# Patient Record
Sex: FEMALE | Race: BLACK OR AFRICAN AMERICAN | Employment: OTHER | URBAN - METROPOLITAN AREA
[De-identification: names, ages, dates, MRNs, and addresses within clinical notes are randomized per-mention and may not be internally consistent; named-entity substitution may affect disease eponyms.]

---

## 2020-12-20 ENCOUNTER — APPOINTMENT (OUTPATIENT)
Dept: CT IMAGING | Age: 75
End: 2020-12-20
Attending: EMERGENCY MEDICINE
Payer: MEDICARE

## 2020-12-20 ENCOUNTER — HOSPITAL ENCOUNTER (OUTPATIENT)
Age: 75
Setting detail: OBSERVATION
Discharge: HOME | End: 2020-12-21
Attending: EMERGENCY MEDICINE | Admitting: STUDENT IN AN ORGANIZED HEALTH CARE EDUCATION/TRAINING PROGRAM
Payer: MEDICARE

## 2020-12-20 ENCOUNTER — APPOINTMENT (OUTPATIENT)
Dept: GENERAL RADIOLOGY | Age: 75
End: 2020-12-20
Attending: STUDENT IN AN ORGANIZED HEALTH CARE EDUCATION/TRAINING PROGRAM
Payer: MEDICARE

## 2020-12-20 DIAGNOSIS — I63.9 CEREBROVASCULAR ACCIDENT (CVA), UNSPECIFIED MECHANISM (HCC): Primary | ICD-10-CM

## 2020-12-20 PROBLEM — R09.89 SUSPECTED CEREBROVASCULAR ACCIDENT (CVA): Status: ACTIVE | Noted: 2020-12-20

## 2020-12-20 LAB
ANION GAP SERPL CALC-SCNC: 11 MMOL/L (ref 5–15)
APPEARANCE UR: CLEAR
BACTERIA URNS QL MICRO: NEGATIVE /HPF
BASOPHILS # BLD: 0.4 K/UL (ref 0–0.1)
BASOPHILS NFR BLD: 4 % (ref 0–1)
BILIRUB UR QL: NEGATIVE
BUN SERPL-MCNC: 24 MG/DL (ref 6–20)
BUN/CREAT SERPL: 20 (ref 12–20)
CALCIUM SERPL-MCNC: 9.1 MG/DL (ref 8.5–10.1)
CHLORIDE SERPL-SCNC: 100 MMOL/L (ref 97–108)
CO2 SERPL-SCNC: 30 MMOL/L (ref 21–32)
COLOR UR: NORMAL
COVID-19 RAPID TEST, COVR: NOT DETECTED
CREAT SERPL-MCNC: 1.22 MG/DL (ref 0.55–1.02)
DIFFERENTIAL METHOD BLD: ABNORMAL
EOSINOPHIL # BLD: 1.1 K/UL (ref 0–0.4)
EOSINOPHIL NFR BLD: 10 % (ref 0–7)
EPITH CASTS URNS QL MICRO: NORMAL /LPF
ERYTHROCYTE [DISTWIDTH] IN BLOOD BY AUTOMATED COUNT: 13.5 % (ref 11.5–14.5)
GLUCOSE BLD STRIP.AUTO-MCNC: 102 MG/DL (ref 65–100)
GLUCOSE BLD STRIP.AUTO-MCNC: 147 MG/DL (ref 65–100)
GLUCOSE SERPL-MCNC: 92 MG/DL (ref 65–100)
GLUCOSE UR STRIP.AUTO-MCNC: NEGATIVE MG/DL
HCT VFR BLD AUTO: 40.8 % (ref 35–47)
HEALTH STATUS, XMCV2T: NORMAL
HGB BLD-MCNC: 13.8 G/DL (ref 11.5–16)
HGB UR QL STRIP: NEGATIVE
IMM GRANULOCYTES # BLD AUTO: 0 K/UL
IMM GRANULOCYTES NFR BLD AUTO: 0 %
KETONES UR QL STRIP.AUTO: NEGATIVE MG/DL
LEUKOCYTE ESTERASE UR QL STRIP.AUTO: NEGATIVE
LYMPHOCYTES # BLD: 3.2 K/UL (ref 0.8–3.5)
LYMPHOCYTES NFR BLD: 30 % (ref 12–49)
MCH RBC QN AUTO: 29.4 PG (ref 26–34)
MCHC RBC AUTO-ENTMCNC: 33.8 G/DL (ref 30–36.5)
MCV RBC AUTO: 86.8 FL (ref 80–99)
MONOCYTES # BLD: 0.9 K/UL (ref 0–1)
MONOCYTES NFR BLD: 9 % (ref 5–13)
NEUTS SEG # BLD: 4.9 K/UL (ref 1.8–8)
NEUTS SEG NFR BLD: 47 % (ref 32–75)
NITRITE UR QL STRIP.AUTO: NEGATIVE
NRBC # BLD: 0 K/UL (ref 0–0.01)
NRBC BLD-RTO: 0 PER 100 WBC
PH UR STRIP: 7 [PH] (ref 5–8)
PLATELET # BLD AUTO: 238 K/UL (ref 150–400)
PMV BLD AUTO: 11.3 FL (ref 8.9–12.9)
POTASSIUM SERPL-SCNC: 3.2 MMOL/L (ref 3.5–5.1)
PROT UR STRIP-MCNC: NEGATIVE MG/DL
RBC # BLD AUTO: 4.7 M/UL (ref 3.8–5.2)
RBC #/AREA URNS HPF: NORMAL /HPF (ref 0–5)
RBC MORPH BLD: ABNORMAL
SERVICE CMNT-IMP: ABNORMAL
SERVICE CMNT-IMP: ABNORMAL
SODIUM SERPL-SCNC: 141 MMOL/L (ref 136–145)
SOURCE, COVRS: NORMAL
SP GR UR REFRACTOMETRY: 1.01 (ref 1–1.03)
SPECIMEN SOURCE, FCOV2M: NORMAL
SPECIMEN TYPE, XMCV1T: NORMAL
TROPONIN I SERPL-MCNC: <0.05 NG/ML
UA: UC IF INDICATED,UAUC: NORMAL
UROBILINOGEN UR QL STRIP.AUTO: 0.2 EU/DL (ref 0.2–1)
WBC # BLD AUTO: 10.5 K/UL (ref 3.6–11)
WBC MORPH BLD: ABNORMAL
WBC URNS QL MICRO: NORMAL /HPF (ref 0–4)

## 2020-12-20 PROCEDURE — 87635 SARS-COV-2 COVID-19 AMP PRB: CPT

## 2020-12-20 PROCEDURE — 74011250637 HC RX REV CODE- 250/637: Performed by: EMERGENCY MEDICINE

## 2020-12-20 PROCEDURE — 96374 THER/PROPH/DIAG INJ IV PUSH: CPT

## 2020-12-20 PROCEDURE — 74011250637 HC RX REV CODE- 250/637: Performed by: STUDENT IN AN ORGANIZED HEALTH CARE EDUCATION/TRAINING PROGRAM

## 2020-12-20 PROCEDURE — 36415 COLL VENOUS BLD VENIPUNCTURE: CPT

## 2020-12-20 PROCEDURE — 85025 COMPLETE CBC W/AUTO DIFF WBC: CPT

## 2020-12-20 PROCEDURE — 80048 BASIC METABOLIC PNL TOTAL CA: CPT

## 2020-12-20 PROCEDURE — 74011250636 HC RX REV CODE- 250/636: Performed by: STUDENT IN AN ORGANIZED HEALTH CARE EDUCATION/TRAINING PROGRAM

## 2020-12-20 PROCEDURE — 71045 X-RAY EXAM CHEST 1 VIEW: CPT

## 2020-12-20 PROCEDURE — 96372 THER/PROPH/DIAG INJ SC/IM: CPT

## 2020-12-20 PROCEDURE — 82962 GLUCOSE BLOOD TEST: CPT

## 2020-12-20 PROCEDURE — 84484 ASSAY OF TROPONIN QUANT: CPT

## 2020-12-20 PROCEDURE — 74011000250 HC RX REV CODE- 250: Performed by: EMERGENCY MEDICINE

## 2020-12-20 PROCEDURE — 74011636637 HC RX REV CODE- 636/637: Performed by: EMERGENCY MEDICINE

## 2020-12-20 PROCEDURE — 70450 CT HEAD/BRAIN W/O DYE: CPT

## 2020-12-20 PROCEDURE — 94640 AIRWAY INHALATION TREATMENT: CPT

## 2020-12-20 PROCEDURE — 74011000636 HC RX REV CODE- 636: Performed by: STUDENT IN AN ORGANIZED HEALTH CARE EDUCATION/TRAINING PROGRAM

## 2020-12-20 PROCEDURE — 99284 EMERGENCY DEPT VISIT MOD MDM: CPT

## 2020-12-20 PROCEDURE — 99218 HC RM OBSERVATION: CPT

## 2020-12-20 PROCEDURE — 81001 URINALYSIS AUTO W/SCOPE: CPT

## 2020-12-20 PROCEDURE — 70496 CT ANGIOGRAPHY HEAD: CPT

## 2020-12-20 PROCEDURE — 93005 ELECTROCARDIOGRAM TRACING: CPT

## 2020-12-20 RX ORDER — MONTELUKAST SODIUM 10 MG/1
10 TABLET ORAL
Status: DISCONTINUED | OUTPATIENT
Start: 2020-12-20 | End: 2020-12-21 | Stop reason: HOSPADM

## 2020-12-20 RX ORDER — LABETALOL HCL 20 MG/4 ML
20 SYRINGE (ML) INTRAVENOUS
Status: DISCONTINUED | OUTPATIENT
Start: 2020-12-20 | End: 2020-12-21 | Stop reason: HOSPADM

## 2020-12-20 RX ORDER — AMLODIPINE BESYLATE 5 MG/1
TABLET ORAL
COMMUNITY
Start: 2020-09-21

## 2020-12-20 RX ORDER — MAGNESIUM SULFATE 100 %
4 CRYSTALS MISCELLANEOUS AS NEEDED
Status: DISCONTINUED | OUTPATIENT
Start: 2020-12-20 | End: 2020-12-21 | Stop reason: HOSPADM

## 2020-12-20 RX ORDER — IPRATROPIUM BROMIDE AND ALBUTEROL SULFATE 2.5; .5 MG/3ML; MG/3ML
3 SOLUTION RESPIRATORY (INHALATION)
Status: COMPLETED | OUTPATIENT
Start: 2020-12-20 | End: 2020-12-20

## 2020-12-20 RX ORDER — ACETAMINOPHEN 325 MG/1
650 TABLET ORAL
Status: DISCONTINUED | OUTPATIENT
Start: 2020-12-20 | End: 2020-12-21 | Stop reason: HOSPADM

## 2020-12-20 RX ORDER — ASPIRIN 81 MG/1
81 TABLET ORAL DAILY
COMMUNITY

## 2020-12-20 RX ORDER — POTASSIUM CHLORIDE 750 MG/1
40 TABLET, FILM COATED, EXTENDED RELEASE ORAL
Status: COMPLETED | OUTPATIENT
Start: 2020-12-20 | End: 2020-12-20

## 2020-12-20 RX ORDER — ONDANSETRON 2 MG/ML
4 INJECTION INTRAMUSCULAR; INTRAVENOUS
Status: DISCONTINUED | OUTPATIENT
Start: 2020-12-20 | End: 2020-12-21 | Stop reason: HOSPADM

## 2020-12-20 RX ORDER — PREDNISONE 20 MG/1
60 TABLET ORAL
Status: DISCONTINUED | OUTPATIENT
Start: 2020-12-21 | End: 2020-12-20

## 2020-12-20 RX ORDER — GUAIFENESIN 100 MG/5ML
81 LIQUID (ML) ORAL DAILY
Status: DISCONTINUED | OUTPATIENT
Start: 2020-12-21 | End: 2020-12-21 | Stop reason: HOSPADM

## 2020-12-20 RX ORDER — ACETAMINOPHEN 650 MG/1
650 SUPPOSITORY RECTAL
Status: DISCONTINUED | OUTPATIENT
Start: 2020-12-20 | End: 2020-12-21 | Stop reason: HOSPADM

## 2020-12-20 RX ORDER — ENOXAPARIN SODIUM 100 MG/ML
40 INJECTION SUBCUTANEOUS EVERY 24 HOURS
Status: DISCONTINUED | OUTPATIENT
Start: 2020-12-20 | End: 2020-12-21 | Stop reason: HOSPADM

## 2020-12-20 RX ORDER — ATORVASTATIN CALCIUM 40 MG/1
40 TABLET, FILM COATED ORAL
Status: DISCONTINUED | OUTPATIENT
Start: 2020-12-20 | End: 2020-12-21 | Stop reason: HOSPADM

## 2020-12-20 RX ORDER — DEXTROSE 50 % IN WATER (D50W) INTRAVENOUS SYRINGE
25-50 AS NEEDED
Status: DISCONTINUED | OUTPATIENT
Start: 2020-12-20 | End: 2020-12-21 | Stop reason: HOSPADM

## 2020-12-20 RX ORDER — HYDROCHLOROTHIAZIDE 25 MG/1
TABLET ORAL
COMMUNITY
Start: 2020-09-21

## 2020-12-20 RX ORDER — INSULIN LISPRO 100 [IU]/ML
INJECTION, SOLUTION INTRAVENOUS; SUBCUTANEOUS
Status: DISCONTINUED | OUTPATIENT
Start: 2020-12-20 | End: 2020-12-21 | Stop reason: HOSPADM

## 2020-12-20 RX ORDER — CLOPIDOGREL BISULFATE 75 MG/1
75 TABLET ORAL DAILY
Status: DISCONTINUED | OUTPATIENT
Start: 2020-12-20 | End: 2020-12-21 | Stop reason: HOSPADM

## 2020-12-20 RX ORDER — PREDNISONE 20 MG/1
60 TABLET ORAL
Status: COMPLETED | OUTPATIENT
Start: 2020-12-20 | End: 2020-12-20

## 2020-12-20 RX ORDER — MONTELUKAST SODIUM 10 MG/1
TABLET ORAL
COMMUNITY
Start: 2020-09-21

## 2020-12-20 RX ADMIN — IOPAMIDOL 80 ML: 755 INJECTION, SOLUTION INTRAVENOUS at 16:42

## 2020-12-20 RX ADMIN — MONTELUKAST SODIUM 10 MG: 10 TABLET, FILM COATED ORAL at 22:00

## 2020-12-20 RX ADMIN — METHYLPREDNISOLONE SODIUM SUCCINATE 40 MG: 40 INJECTION, POWDER, FOR SOLUTION INTRAMUSCULAR; INTRAVENOUS at 22:35

## 2020-12-20 RX ADMIN — IPRATROPIUM BROMIDE AND ALBUTEROL 1 PUFF: 20; 100 SPRAY, METERED RESPIRATORY (INHALATION) at 22:35

## 2020-12-20 RX ADMIN — ENOXAPARIN SODIUM 40 MG: 100 INJECTION SUBCUTANEOUS at 22:32

## 2020-12-20 RX ADMIN — PREDNISONE 60 MG: 20 TABLET ORAL at 17:07

## 2020-12-20 RX ADMIN — POTASSIUM CHLORIDE 40 MEQ: 750 TABLET, EXTENDED RELEASE ORAL at 16:58

## 2020-12-20 RX ADMIN — IPRATROPIUM BROMIDE AND ALBUTEROL SULFATE 3 ML: .5; 3 SOLUTION RESPIRATORY (INHALATION) at 17:08

## 2020-12-20 RX ADMIN — CLOPIDOGREL BISULFATE 75 MG: 75 TABLET, FILM COATED ORAL at 22:34

## 2020-12-20 RX ADMIN — ATORVASTATIN CALCIUM 40 MG: 40 TABLET, FILM COATED ORAL at 22:34

## 2020-12-20 NOTE — H&P
Hospitalist Admission Note    NAME: Terri Becerra   :  1945   MRN:  [id number]   Room Number: TW96/58  @ [facility]     Date/Time:  2020 5:31 PM    Patient PCP: Elder Cason MD  ______________________________________________________________________  Given the patient's current clinical presentation, I have a high level of concern for decompensation if discharged from the emergency department. Complex decision making was performed, which includes reviewing the patient's available past medical records, laboratory results, and x-ray films. My assessment of this patient's clinical condition and my plan of care is as follows. Assessment / Plan: Active Problems:    Suspected cerebrovascular accident (CVA) (2020)      Suspected CVA/TIA POA  Acute onset dizziness, blurry vision that resolved on presentation to the ER. CT head interval independently negative for acute intracranial hemorrhage. CTA head and neck with a technically limited studyno evidence for central large vessel arterial occlusion    Aspirin, Plavix, statin  MRI brain without contrast  TTE  Continue telemetry  Allow permissive hypertension. SBP goal less than 220 mm Hg, DBP Goal < 120 mm Hg   Neurology consult  PT/OT  No speech or swallowing dysfunction, does not need SLP eval      Acute exacerbation of moderate persistent asthma POA  Report wheezing for past 5 days. Saturating well on room air, not in respiratory distress. CXR interpreted independentlyshows no acute process. Rapid Covid test negative. Patient does not take influenza vaccine. Solu-Medrol 40 mg IV every 8 hours  Montelukast  Albuterol nebulizer scheduled and as needed. Hypertension POA  Hyperlipidemia POA  - check lipid panel  - statin   - Hold antihypertensives. Allow permissive hypertension - goal SBP < 220 mm Hg, goal DBP < 120 mm Hg    Elevated creatinine POA   BUN/Cr /1. 22. Unclear baseline. - Repeat chem 10 tomorrow. Could have ckd due to long standing hypertension       Morbid obesity POA BMI 32.54. Counseled on Lifestyle modifications, AHA Diet ,weight loss strategies. There is no height or weight on file to calculate BMI. Code Status: full  Surrogate Decision Maker:  Alem Garcia, daughter, cellphone number [phone]      DVT Prophylaxis: Lovenox  GI Prophylaxis: not indicated  Baseline: ambulatory independently        Subjective:   CHIEF COMPLAINT: lightheadeness, blurry vision    HISTORY OF PRESENT ILLNESS:     Terri eBcerra is a 76 y.o.  female with PMH of HTN, HLD who presents to ED with c/o blurred vision, dizziness. Patient reports acute onset of blurred vision, dizziness described as \"my head felt weird. I felt like I was about to pass out and felt off balance \"that began around 1:30 PM this afternoon when she was cooking. Symptoms have completely resolved on presentation to the ER. She denies any focal weakness or numbness, dysarthria, dysphagia, hearing loss, tinnitus, facial asymmetry. She lives at home with her grandson. Works part-time as a nurse at [employer]. Reports wheezing, nonproductive cough for the past 5 days which she feels is usual for her asthma exacerbation. Reports that usually occurs with cold weather. Denies any fevers or chills. No known exposure to COVID-19. Has not taken an influenza vaccination this season, she never takes influenza vaccinations. Non-smoker  No alcohol use  No illicit drug use  Ambulatory independently    We were asked to admit for work up and evaluation of the above problems. Past Medical History:   Diagnosis Date    Hypertension         History reviewed. No pertinent surgical history.     Social History     Tobacco Use    Smoking status: Never Smoker    Smokeless tobacco: Never Used   Substance Use Topics    Alcohol Use     Frequency: Never        Hx of CAD in father, T2DM in mother  Allergies   Allergen Reactions    Lisinopril Cough        Prior to Admission medications    Medication Sig Start Date End Date Taking? Authorizing Provider   montelukast (SINGULAIR) 10 mg tablet  9/21/20  Yes Other, MD Bianca   hydroCHLOROthiazide (HYDRODIURIL) 25 mg tablet  9/21/20  Yes Other, MD Bianca   amLODIPine (NORVASC) 5 mg tablet  9/21/20  Yes Other, MD Bianca   aspirin delayed-release 81 mg tablet Take 81 mg by mouth daily. Yes Other, MD Bianca       REVIEW OF SYSTEMS:     I am not able to complete the review of systems because:    The patient is intubated and sedated    The patient has altered mental status due to his acute medical problems    The patient has baseline aphasia from prior stroke(s)    The patient has baseline dementia and is not reliable historian    The patient is in acute medical distress and unable to provide information           Total of 12 systems reviewed as follows:       POSITIVE= underlined text  Negative = text not underlined  General:  fever, chills, sweats, generalized weakness, weight loss/gain,      loss of appetite   Eyes:    blurred vision, eye pain, loss of vision, double vision  ENT:    rhinorrhea, pharyngitis   Respiratory:   cough, sputum production, SOB, FLORES, wheezing, pleuritic pain   Cardiology:   chest pain, palpitations, orthopnea, PND, edema, syncope   Gastrointestinal:  abdominal pain , N/V, diarrhea, dysphagia, constipation, bleeding   Genitourinary:  frequency, urgency, dysuria, hematuria, incontinence   Muskuloskeletal :  arthralgia, myalgia, back pain  Hematology:  easy bruising, nose or gum bleeding, lymphadenopathy   Dermatological: rash, ulceration, pruritis, color change / jaundice  Endocrine:   hot flashes or polydipsia   Neurological:  headache, dizziness, confusion, focal weakness, paresthesia,     Speech difficulties, memory loss, gait difficulty  Psychological: Feelings of anxiety, depression, agitation    Objective:   VITALS:    Visit Vitals  BP (!) 178/86   Pulse 78   Temp 98.4 °F (36.9 °C)   Resp 19   Wt 116.3 kg (256 lb 8 oz)   SpO2 100%       PHYSICAL EXAM:    General:    Alert, cooperative, no distress, appears stated age. HEENT: Atraumatic, anicteric sclerae, pink conjunctivae     No oral ulcers, mucosa moist, throat clear, dentition fair  Neck:  Supple, symmetrical,  thyroid: non tender  Lungs:   Bilateral expiratory wheezing  Chest wall:  No tenderness  No Accessory muscle use. Heart:   Regular  rhythm,  No  murmur   No edema  Abdomen:   Soft, non-tender. Not distended. Bowel sounds normal  Extremities: No cyanosis. No clubbing,      Skin turgor normal, Capillary refill normal, Radial dial pulse 2+  Skin:     Not pale. Not Jaundiced  No rashes   Psych:  Good insight. Not depressed. Not anxious or agitated. Neurologic: EOMs intact. No facial asymmetry. No aphasia or slurred speech. Symmetrical strength, Sensation grossly intact. Alert and oriented X 4.     ______________________________________________________________________    Care Plan discussed with:  Patient/Family and Nurse    Expected  Disposition:  Home w/Family  ________________________________________________________________________  TOTAL TIME:  36 Minutes    Critical Care Provided     Minutes non procedure based      Comments    x Reviewed previous records   >50% of visit spent in counseling and coordination of care x Discussion with patient and/or family and questions answered       ________________________________________________________________________  Signed: Evan Jacobs MD    Procedures: see electronic medical records for all procedures/Xrays and details which were not copied into this note but were reviewed prior to creation of Plan.     LAB DATA REVIEWED:    Recent Results (from the past 24 hour(s))   GLUCOSE, POC    Collection Time: 12/20/20  2:44 PM   Result Value Ref Range    Glucose (POC) 102 (H) 65 - 100 mg/dL    Performed by Esperanza Pino \"Elizabeth\" EKG, 12 LEAD, INITIAL    Collection Time: 12/20/20  3:15 PM   Result Value Ref Range    Ventricular Rate 73 BPM    Atrial Rate 73 BPM    P-R Interval 214 ms    QRS Duration 94 ms    Q-T Interval 420 ms    QTC Calculation (Bezet) 462 ms    Calculated P Axis 43 degrees    Calculated R Axis 47 degrees    Calculated T Axis 53 degrees    Diagnosis       Sinus rhythm with 1st degree AV block  Otherwise normal ECG  No previous ECGs available     CBC WITH AUTOMATED DIFF    Collection Time: 12/20/20  3:30 PM   Result Value Ref Range    WBC 10.5 3.6 - 11.0 K/uL    RBC 4.70 3.80 - 5.20 M/uL    HGB 13.8 11.5 - 16.0 g/dL    HCT 40.8 35.0 - 47.0 %    MCV 86.8 80.0 - 99.0 FL    MCH 29.4 26.0 - 34.0 PG    MCHC 33.8 30.0 - 36.5 g/dL    RDW 13.5 11.5 - 14.5 %    PLATELET 743 656 - 615 K/uL    MPV 11.3 8.9 - 12.9 FL    NRBC 0.0 0  WBC    ABSOLUTE NRBC 0.00 0.00 - 0.01 K/uL    NEUTROPHILS 47 32 - 75 %    LYMPHOCYTES 30 12 - 49 %    MONOCYTES 9 5 - 13 %    EOSINOPHILS 10 (H) 0 - 7 %    BASOPHILS 4 (H) 0 - 1 %    IMMATURE GRANULOCYTES 0 %    ABS. NEUTROPHILS 4.9 1.8 - 8.0 K/UL    ABS. LYMPHOCYTES 3.2 0.8 - 3.5 K/UL    ABS. MONOCYTES 0.9 0.0 - 1.0 K/UL    ABS. EOSINOPHILS 1.1 (H) 0.0 - 0.4 K/UL    ABS. BASOPHILS 0.4 (H) 0.0 - 0.1 K/UL    ABS. IMM.  GRANS. 0.0 K/UL    DF MANUAL      RBC COMMENTS NORMOCYTIC, NORMOCHROMIC      WBC COMMENTS REACTIVE LYMPHS     METABOLIC PANEL, BASIC    Collection Time: 12/20/20  3:30 PM   Result Value Ref Range    Sodium 141 136 - 145 mmol/L    Potassium 3.2 (L) 3.5 - 5.1 mmol/L    Chloride 100 97 - 108 mmol/L    CO2 30 21 - 32 mmol/L    Anion gap 11 5 - 15 mmol/L    Glucose 92 65 - 100 mg/dL    BUN 24 (H) 6 - 20 MG/DL    Creatinine 1.22 (H) 0.55 - 1.02 MG/DL    BUN/Creatinine ratio 20 12 - 20      GFR est AA 52 (L) >60 ml/min/1.73m2    GFR est non-AA 43 (L) >60 ml/min/1.73m2    Calcium 9.1 8.5 - 10.1 MG/DL   TROPONIN I    Collection Time: 12/20/20  3:30 PM   Result Value Ref Range    Troponin-I, Qt. <0.05 <0.05 ng/mL   URINALYSIS W/ REFLEX CULTURE    Collection Time: 12/20/20  3:30 PM    Specimen: Urine   Result Value Ref Range    Color YELLOW/STRAW      Appearance CLEAR CLEAR      Specific gravity 1.015 1.003 - 1.030      pH (UA) 7.0 5.0 - 8.0      Protein Negative NEG mg/dL    Glucose Negative NEG mg/dL    Ketone Negative NEG mg/dL    Bilirubin Negative NEG      Blood Negative NEG      Urobilinogen 0.2 0.2 - 1.0 EU/dL    Nitrites Negative NEG      Leukocyte Esterase Negative NEG      WBC 0-4 0 - 4 /hpf    RBC 0-5 0 - 5 /hpf    Epithelial cells FEW FEW /lpf    Bacteria Negative NEG /hpf    UA:UC IF INDICATED CULTURE NOT INDICATED BY UA RESULT CNI

## 2020-12-20 NOTE — ED NOTES
Pt presents to ED ambulatory complaining of unsteady gait, dizziness, feeling like \"I was gonna pass out\", feeling \"weird\" since 1330. NIH score 0. Pt denies blurred vision, n/v, fever, chills, cp, sob. Pt reports productive cough (clear, thick) x weeks due to \"allergies\". Pt reports taking musinex. Pt is alert and oriented x 4, RR even and unlabored, skin is warm and dry. Assessment completed and pt updated on plan of care. Call bell in reach. 1659 update: pt reports wheezing, pt reports using neb at home 2-3x a day    Emergency [address] is developed from the Nursing assessment and Emergency Department Attending provider initial evaluation. The plan of care may be reviewed in the ED Provider note.     The Plan of Care was developed with the following considerations:   Patient / Family readiness to learn indicated by:verbalized understanding  Persons(s) to be included in education: patient  Barriers to Learning/Limitations:No    Signed     Mildred Kras    12/20/2020   3:28 PM

## 2020-12-20 NOTE — ED PROVIDER NOTES
EMERGENCY DEPARTMENT HISTORY AND PHYSICAL EXAM      Date: 12/20/2020  Patient Name: Kirsten Pastrana    History of Presenting Illness     Chief Complaint   Patient presents with    Dizziness    Gait Problem       History Provided By: Patient    HPI: Kirsten Pastrana, 76 y.o. female with h/o HTN presents to the ED for an abrupt onset of \"feeling funny in the head\" a/w blurry vision that began while she was cooking just shortly PTA. Denies any headache, nausea/vomiting, or numbness/weakness of her extremities. No aggravating, or relieving factors. She has never experienced these Sx before. Denies Hx of stroke. There are no other complaints, changes, or physical findings at this time. PCP: No primary care provider on file. No current facility-administered medications on file prior to encounter. Current Outpatient Medications on File Prior to Encounter   Medication Sig Dispense Refill    montelukast (SINGULAIR) 10 mg tablet       hydroCHLOROthiazide (HYDRODIURIL) 25 mg tablet       amLODIPine (NORVASC) 5 mg tablet       aspirin delayed-release 81 mg tablet Take 81 mg by mouth daily. Past History     Past Medical History:  History reviewed. No pertinent past medical history. Past Surgical History:  History reviewed. No pertinent surgical history. Family History:  History reviewed. No pertinent family history. Social History:  Social History     Tobacco Use    Smoking status: Never Smoker    Smokeless tobacco: Never Used   Substance Use Topics    Alcohol Use     Frequency: Never    Drug use: Not on file       Allergies: Allergies   Allergen Reactions    Lisinopril Cough         Review of Systems   Review of Systems   Constitutional: Negative for chills, fatigue and fever. HENT: Negative. Negative for sore throat. Eyes: Positive for visual disturbance. Respiratory: Negative for cough and shortness of breath. Cardiovascular: Negative for chest pain and palpitations. Gastrointestinal: Negative for abdominal pain, nausea and vomiting. Genitourinary: Negative for dysuria. Musculoskeletal: Negative. Skin: Negative. Neurological: Negative for dizziness, facial asymmetry, speech difficulty, weakness, light-headedness, numbness and headaches. Psychiatric/Behavioral: Negative. All other systems reviewed and are negative. Physical Exam   Physical Exam  Vitals signs and nursing note reviewed. Constitutional:       General: She is not in acute distress. Appearance: She is not toxic-appearing. HENT:      Head: Normocephalic and atraumatic. Mouth/Throat:      Mouth: Mucous membranes are moist.   Eyes:      Pupils: Pupils are equal, round, and reactive to light. Neck:      Musculoskeletal: Normal range of motion. Cardiovascular:      Rate and Rhythm: Normal rate and regular rhythm. Pulses: Normal pulses. Pulmonary:      Effort: Pulmonary effort is normal. No respiratory distress. Breath sounds: Normal breath sounds. Abdominal:      General: There is no distension. Tenderness: There is no abdominal tenderness. Musculoskeletal: Normal range of motion. General: No tenderness. Skin:     General: Skin is warm. Findings: No rash. Neurological:      General: No focal deficit present. Mental Status: She is alert and oriented to person, place, and time. Cranial Nerves: No cranial nerve deficit. Sensory: No sensory deficit. Diagnostic Study Results     Labs -     Recent Results (from the past 12 hour(s))   GLUCOSE, POC    Collection Time: 12/20/20  2:44 PM   Result Value Ref Range    Glucose (POC) 102 (H) 65 - 100 mg/dL    Performed by Sosa Portillo"        Radiologic Studies -   CT CODE NEURO HEAD WO CONTRAST   Final Result   IMPRESSION:    No acute intracranial findings. Sinus disease.             CTA CODE NEURO HEAD AND NECK W CONT    (Results Pending)     CT Results  (Last 48 hours) 12/20/20 1452  CT CODE NEURO HEAD WO CONTRAST Final result    Impression:  IMPRESSION:    No acute intracranial findings. Sinus disease. Narrative:  EXAM: CT CODE NEURO HEAD WO CONTRAST       INDICATION: headache       COMPARISON: None. CONTRAST: None. TECHNIQUE: Unenhanced CT of the head was performed using 5 mm images. Brain and   bone windows were generated. Coronal and sagittal reformats. CT dose reduction   was achieved through use of a standardized protocol tailored for this   examination and automatic exposure control for dose modulation. FINDINGS:   The ventricles and sulci are normal in size, shape and configuration. . There is   no significant white matter disease. There is no intracranial hemorrhage,   extra-axial collection, or mass effect. The basilar cisterns are open. No CT   evidence of acute infarct. The bone windows demonstrate no abnormalities. There is mucosal thickening of   the frontal and ethmoid sinuses. There is a fluid level in the right maxillary   sinus. CXR Results  (Last 48 hours)    None          Medical Decision Making   I am the first provider for this patient. I reviewed the vital signs, available nursing notes, past medical history, past surgical history, family history and social history. Vital Signs-Reviewed the patient's vital signs. Patient Vitals for the past 12 hrs:   Temp Pulse Resp BP SpO2   12/20/20 1500  83 16 (!) 191/80 99 %   12/20/20 1443 98.4 °F (36.9 °C) 93 17 (!) 198/65 97 %       EKG interpretation: (Preliminary)  Rhythm: normal sinus rhythm; and regular . Rate (approx.): 5164; Axis: normal; KS interval: normal; QRS interval: normal ; ST/T wave: normal;   EKG read and interpreted by Emely Montes MD     Records Reviewed: Nursing Notes    Provider Notes (Medical Decision Making):   DDx: CVA, TIA, vasovagal reaction    ED Course:   Initial assessment performed.  The patients presenting problems have been discussed, and they are in agreement with the care plan formulated and outlined with them. I have encouraged them to ask questions as they arise throughout their visit. Kiesha Varela  1945    Arrival time to ED: [address]: 1435    Physician at Bedside:  [phone]    CT Order Time: 5601    ACT Page: 1437    ACT Call Back: [phone]     Cancel Code S: n/a    Consult Note:  3:09 Gerber Barr MD spoke with Dr. Pako Preston  Specialty: Neurology  Discussed pts hx, disposition, and available diagnostic and imaging results. Reviewed care plans. Consultant agrees with plans as outlined. Will evaluate Pt. Consult Note:  3:15 Gerber Barr MD spoke with Dr. Pako Preston  Specialty: Neurology  Discussed pts hx, disposition, and available diagnostic and imaging results. Reviewed care plans. Consultant agrees with plans as outlined. Dr. Pako Preston would like CTA head/neck with contrast and admit for overnight obs and MRI. CONSULT NOTE:   3:22 Gerber Barr MD spoke with Dr Yohana Long,   Specialty: Hospitalist  Discussed pt's hx, disposition, and available diagnostic and imaging results. Reviewed care plans. Consultant will evaluate pt for admission. CRITICAL CARE NOTE :    3:23 PM    IMPENDING DETERIORATION -Cardiovascular, CNS and Metabolic  ASSOCIATED RISK FACTORS - Metabolic changes, Dehydration and CNS Decompensation  MANAGEMENT- Bedside Assessment and Supervision of Care  INTERPRETATION -  CT Scan, ECG and Blood Pressure  INTERVENTIONS - hemodynamic mngmt and Neurologic interventions   CASE REVIEW - Hospitalist, Medical Sub-Specialist, Nursing and Family  TREATMENT RESPONSE -Stable  PERFORMED BY - Self    NOTES   :  I have spent 60 minutes of critical care time involved in lab review, consultations with specialist, family decision- making, bedside attention and documentation. This time excludes time spent in any separate billed procedures.   During this entire length of time I was immediately available to the patient . Disposition:  ADMIT  3:23 PM  Patient is being admitted to the hospital. The results of their tests and reasons for their admission have been discussed with them and/or available family. They convey agreement and understanding for the need to be admitted and for their admission diagnosis. Consultation has been made with the inpatient physician specialist for hospitalization. Diagnosis     Clinical Impression:   1. Cerebrovascular accident (CVA), unspecified mechanism ([address]        Attestations: This note is prepared by [facility], acting as Scribe for Harvis Sandifer, MD.     Harvis Sandifer, MD. The scribe's documentation has been prepared under my direction and personally reviewed by me in its entirety. I confirm that the note above accurately reflects all work, treatment, procedures and medical decision making performed by me.

## 2020-12-20 NOTE — ED TRIAGE NOTES
Patient presents with sudden onset \"feeling weird in my head,\" and unsteady gait starting around 1pm this afternoon. She denies slurred speech or weakness. Notified MD and patient placed in bed 5.

## 2020-12-21 ENCOUNTER — APPOINTMENT (OUTPATIENT)
Dept: NON INVASIVE DIAGNOSTICS | Age: 75
End: 2020-12-21
Attending: STUDENT IN AN ORGANIZED HEALTH CARE EDUCATION/TRAINING PROGRAM
Payer: MEDICARE

## 2020-12-21 ENCOUNTER — APPOINTMENT (OUTPATIENT)
Dept: MRI IMAGING | Age: 75
End: 2020-12-21
Attending: STUDENT IN AN ORGANIZED HEALTH CARE EDUCATION/TRAINING PROGRAM
Payer: MEDICARE

## 2020-12-21 VITALS
SYSTOLIC BLOOD PRESSURE: 154 MMHG | OXYGEN SATURATION: 100 % | RESPIRATION RATE: 19 BRPM | DIASTOLIC BLOOD PRESSURE: 65 MMHG | TEMPERATURE: 98.4 F | WEIGHT: 256.5 LBS | HEART RATE: 81 BPM

## 2020-12-21 LAB
ANION GAP SERPL CALC-SCNC: 9 MMOL/L (ref 5–15)
ATRIAL RATE: 73 BPM
BASOPHILS # BLD: 0 K/UL (ref 0–0.1)
BASOPHILS NFR BLD: 0 % (ref 0–1)
BUN SERPL-MCNC: 20 MG/DL (ref 6–20)
BUN/CREAT SERPL: 18 (ref 12–20)
CALCIUM SERPL-MCNC: 9.2 MG/DL (ref 8.5–10.1)
CALCULATED P AXIS, ECG09: 43 DEGREES
CALCULATED R AXIS, ECG10: 47 DEGREES
CALCULATED T AXIS, ECG11: 53 DEGREES
CHLORIDE SERPL-SCNC: 103 MMOL/L (ref 97–108)
CHOLEST SERPL-MCNC: 252 MG/DL
CO2 SERPL-SCNC: 27 MMOL/L (ref 21–32)
CREAT SERPL-MCNC: 1.11 MG/DL (ref 0.55–1.02)
DIAGNOSIS, 93000: NORMAL
DIFFERENTIAL METHOD BLD: ABNORMAL
ECHO AO ROOT DIAM: 2.69 CM
ECHO AV AREA PLAN: 2.79 CM2
ECHO LA AREA 4C: 11.8 CM2
ECHO LA MAJOR AXIS: 2.72 CM
ECHO LA MINOR AXIS: 1.29 CM
ECHO LA VOL 4C: 23.38 ML (ref 22–52)
ECHO LA VOLUME INDEX A4C: 11.11 ML/M2 (ref 16–28)
ECHO LV EDV A4C: 109.48 ML
ECHO LV EDV INDEX A4C: 52 ML/M2
ECHO LV EJECTION FRACTION A4C: 71 PERCENT
ECHO LV ESV A4C: 31.74 ML
ECHO LV ESV INDEX A4C: 15.1 ML/M2
ECHO LV INTERNAL DIMENSION DIASTOLIC: 4.15 CM (ref 3.9–5.3)
ECHO LV INTERNAL DIMENSION SYSTOLIC: 2.34 CM
ECHO LV IVSD: 0.97 CM (ref 0.6–0.9)
ECHO LV MASS 2D: 169.6 G (ref 67–162)
ECHO LV MASS INDEX 2D: 80.6 G/M2 (ref 43–95)
ECHO LV POSTERIOR WALL DIASTOLIC: 1.38 CM (ref 0.6–0.9)
ECHO LVOT DIAM: 2.05 CM
ECHO LVOT PEAK GRADIENT: 3.19 MMHG
ECHO LVOT PEAK VELOCITY: 89.33 CM/S
ECHO MV A VELOCITY: 52.59 CM/S
ECHO MV AREA PHT: 4.71 CM2
ECHO MV AREA PHT: 5.74 CM2
ECHO MV AREA PLAN: 6.43 CM2
ECHO MV E DECELERATION TIME (DT): 161 MS
ECHO MV E VELOCITY: 27.89 CM/S
ECHO MV E/A RATIO: 0.53
ECHO MV MAX VELOCITY: 70.94 CM/S
ECHO MV MEAN GRADIENT: 0.72 MMHG
ECHO MV PEAK GRADIENT: 2.01 MMHG
ECHO MV PRESSURE HALF TIME (PHT): 38.31 MS
ECHO MV PRESSURE HALF TIME (PHT): 46.69 MS
ECHO MV VTI: 13.04 CM
ECHO PV PEAK INSTANTANEOUS GRADIENT SYSTOLIC: 3 MMHG
ECHO PV REGURGITANT MAX VELOCITY: 86.52 CM/S
ECHO RA AREA 4C: 15.38 CM2
EOSINOPHIL # BLD: 0 K/UL (ref 0–0.4)
EOSINOPHIL NFR BLD: 0 % (ref 0–7)
ERYTHROCYTE [DISTWIDTH] IN BLOOD BY AUTOMATED COUNT: 13.4 % (ref 11.5–14.5)
EST. AVERAGE GLUCOSE BLD GHB EST-MCNC: 117 MG/DL
GLUCOSE BLD STRIP.AUTO-MCNC: 133 MG/DL (ref 65–100)
GLUCOSE BLD STRIP.AUTO-MCNC: 154 MG/DL (ref 65–100)
GLUCOSE SERPL-MCNC: 147 MG/DL (ref 65–100)
HBA1C MFR BLD: 5.7 % (ref 4–5.6)
HCT VFR BLD AUTO: 37.3 % (ref 35–47)
HDLC SERPL-MCNC: 58 MG/DL
HDLC SERPL: 4.3 {RATIO} (ref 0–5)
HGB BLD-MCNC: 12.7 G/DL (ref 11.5–16)
IMM GRANULOCYTES # BLD AUTO: 0.1 K/UL (ref 0–0.04)
IMM GRANULOCYTES NFR BLD AUTO: 1 % (ref 0–0.5)
LDLC SERPL CALC-MCNC: 183.2 MG/DL (ref 0–100)
LIPID PROFILE,FLP: ABNORMAL
LYMPHOCYTES # BLD: 1.5 K/UL (ref 0.8–3.5)
LYMPHOCYTES NFR BLD: 16 % (ref 12–49)
MAGNESIUM SERPL-MCNC: 1.9 MG/DL (ref 1.6–2.4)
MCH RBC QN AUTO: 29.3 PG (ref 26–34)
MCHC RBC AUTO-ENTMCNC: 34 G/DL (ref 30–36.5)
MCV RBC AUTO: 85.9 FL (ref 80–99)
MONOCYTES # BLD: 0.2 K/UL (ref 0–1)
MONOCYTES NFR BLD: 2 % (ref 5–13)
NEUTS SEG # BLD: 7.2 K/UL (ref 1.8–8)
NEUTS SEG NFR BLD: 81 % (ref 32–75)
NRBC # BLD: 0 K/UL (ref 0–0.01)
NRBC BLD-RTO: 0 PER 100 WBC
P-R INTERVAL, ECG05: 214 MS
PHOSPHATE SERPL-MCNC: 2.5 MG/DL (ref 2.6–4.7)
PLATELET # BLD AUTO: 236 K/UL (ref 150–400)
PMV BLD AUTO: 11.1 FL (ref 8.9–12.9)
POTASSIUM SERPL-SCNC: 3.7 MMOL/L (ref 3.5–5.1)
Q-T INTERVAL, ECG07: 420 MS
QRS DURATION, ECG06: 94 MS
QTC CALCULATION (BEZET), ECG08: 462 MS
RBC # BLD AUTO: 4.34 M/UL (ref 3.8–5.2)
SERVICE CMNT-IMP: ABNORMAL
SERVICE CMNT-IMP: ABNORMAL
SODIUM SERPL-SCNC: 139 MMOL/L (ref 136–145)
TRIGL SERPL-MCNC: 54 MG/DL (ref ?–150)
VENTRICULAR RATE, ECG03: 73 BPM
VLDLC SERPL CALC-MCNC: 10.8 MG/DL
WBC # BLD AUTO: 8.9 K/UL (ref 3.6–11)

## 2020-12-21 PROCEDURE — 80048 BASIC METABOLIC PNL TOTAL CA: CPT

## 2020-12-21 PROCEDURE — 74011250637 HC RX REV CODE- 250/637: Performed by: STUDENT IN AN ORGANIZED HEALTH CARE EDUCATION/TRAINING PROGRAM

## 2020-12-21 PROCEDURE — 36415 COLL VENOUS BLD VENIPUNCTURE: CPT

## 2020-12-21 PROCEDURE — 74011250636 HC RX REV CODE- 250/636: Performed by: STUDENT IN AN ORGANIZED HEALTH CARE EDUCATION/TRAINING PROGRAM

## 2020-12-21 PROCEDURE — 85025 COMPLETE CBC W/AUTO DIFF WBC: CPT

## 2020-12-21 PROCEDURE — 94640 AIRWAY INHALATION TREATMENT: CPT

## 2020-12-21 PROCEDURE — 83036 HEMOGLOBIN GLYCOSYLATED A1C: CPT

## 2020-12-21 PROCEDURE — 83735 ASSAY OF MAGNESIUM: CPT

## 2020-12-21 PROCEDURE — 99218 HC RM OBSERVATION: CPT

## 2020-12-21 PROCEDURE — 80061 LIPID PANEL: CPT

## 2020-12-21 PROCEDURE — 96376 TX/PRO/DX INJ SAME DRUG ADON: CPT

## 2020-12-21 PROCEDURE — 70551 MRI BRAIN STEM W/O DYE: CPT

## 2020-12-21 PROCEDURE — 74011636637 HC RX REV CODE- 636/637: Performed by: STUDENT IN AN ORGANIZED HEALTH CARE EDUCATION/TRAINING PROGRAM

## 2020-12-21 PROCEDURE — 84100 ASSAY OF PHOSPHORUS: CPT

## 2020-12-21 PROCEDURE — 82962 GLUCOSE BLOOD TEST: CPT

## 2020-12-21 PROCEDURE — 74011000250 HC RX REV CODE- 250: Performed by: STUDENT IN AN ORGANIZED HEALTH CARE EDUCATION/TRAINING PROGRAM

## 2020-12-21 PROCEDURE — 94761 N-INVAS EAR/PLS OXIMETRY MLT: CPT

## 2020-12-21 PROCEDURE — 93306 TTE W/DOPPLER COMPLETE: CPT

## 2020-12-21 RX ORDER — ALBUTEROL SULFATE 1.25 MG/3ML
1.25 SOLUTION RESPIRATORY (INHALATION)
Qty: 30 NEBULE | Refills: 0 | Status: SHIPPED | OUTPATIENT
Start: 2020-12-21

## 2020-12-21 RX ORDER — IPRATROPIUM BROMIDE AND ALBUTEROL SULFATE 2.5; .5 MG/3ML; MG/3ML
3 SOLUTION RESPIRATORY (INHALATION)
Status: DISCONTINUED | OUTPATIENT
Start: 2020-12-21 | End: 2020-12-21 | Stop reason: HOSPADM

## 2020-12-21 RX ORDER — ALBUTEROL SULFATE 2.5 MG/.5ML
2.5 SOLUTION RESPIRATORY (INHALATION)
Status: DISCONTINUED | OUTPATIENT
Start: 2020-12-21 | End: 2020-12-21

## 2020-12-21 RX ORDER — PREDNISONE 20 MG/1
40 TABLET ORAL
Qty: 10 TAB | Refills: 0 | Status: SHIPPED | OUTPATIENT
Start: 2020-12-21

## 2020-12-21 RX ORDER — FENOFIBRATE 130 MG/1
130 CAPSULE ORAL
Qty: 30 CAP | Refills: 0 | Status: SHIPPED | OUTPATIENT
Start: 2020-12-21

## 2020-12-21 RX ORDER — HYDROCHLOROTHIAZIDE 25 MG/1
25 TABLET ORAL DAILY
Status: DISCONTINUED | OUTPATIENT
Start: 2020-12-21 | End: 2020-12-21 | Stop reason: HOSPADM

## 2020-12-21 RX ORDER — FLUTICASONE PROPIONATE AND SALMETEROL 100; 50 UG/1; UG/1
1 POWDER RESPIRATORY (INHALATION) EVERY 12 HOURS
Qty: 1 INHALER | Refills: 1 | Status: SHIPPED | OUTPATIENT
Start: 2020-12-21

## 2020-12-21 RX ORDER — FENOFIBRATE 67 MG/1
67 CAPSULE ORAL
Qty: 30 CAP | Refills: 0 | Status: CANCELLED | OUTPATIENT
Start: 2020-12-21

## 2020-12-21 RX ORDER — AMLODIPINE BESYLATE 5 MG/1
5 TABLET ORAL DAILY
Status: DISCONTINUED | OUTPATIENT
Start: 2020-12-21 | End: 2020-12-21 | Stop reason: HOSPADM

## 2020-12-21 RX ORDER — SODIUM CHLORIDE 0.9 % (FLUSH) 0.9 %
10 SYRINGE (ML) INJECTION AS NEEDED
Status: DISCONTINUED | OUTPATIENT
Start: 2020-12-21 | End: 2020-12-21 | Stop reason: HOSPADM

## 2020-12-21 RX ADMIN — AMLODIPINE BESYLATE 5 MG: 5 TABLET ORAL at 12:54

## 2020-12-21 RX ADMIN — Medication 10 ML: at 11:43

## 2020-12-21 RX ADMIN — IPRATROPIUM BROMIDE AND ALBUTEROL SULFATE 3 ML: 2.5; .5 SOLUTION RESPIRATORY (INHALATION) at 03:54

## 2020-12-21 RX ADMIN — IPRATROPIUM BROMIDE AND ALBUTEROL SULFATE 3 ML: 2.5; .5 SOLUTION RESPIRATORY (INHALATION) at 07:43

## 2020-12-21 RX ADMIN — Medication 10 ML: at 11:45

## 2020-12-21 RX ADMIN — HYDROCHLOROTHIAZIDE 25 MG: 25 TABLET ORAL at 12:54

## 2020-12-21 RX ADMIN — IPRATROPIUM BROMIDE AND ALBUTEROL 1 PUFF: 20; 100 SPRAY, METERED RESPIRATORY (INHALATION) at 00:00

## 2020-12-21 RX ADMIN — ASPIRIN 81 MG 81 MG: 81 TABLET ORAL at 08:12

## 2020-12-21 RX ADMIN — INSULIN LISPRO 2 UNITS: 100 INJECTION, SOLUTION INTRAVENOUS; SUBCUTANEOUS at 08:12

## 2020-12-21 RX ADMIN — METHYLPREDNISOLONE SODIUM SUCCINATE 40 MG: 40 INJECTION, POWDER, FOR SOLUTION INTRAMUSCULAR; INTRAVENOUS at 08:12

## 2020-12-21 NOTE — ED NOTES
Hospitalist is speaking with pt at this time. Case management is finished with pt. Waiting to discharge pt.

## 2020-12-21 NOTE — PROGRESS NOTES
PT note: Orders received and acknowledged. Patient currently getting ECHO at bedside. Will follow up for PT eval later.  Maurice Gates, PT

## 2020-12-21 NOTE — ACP (ADVANCE CARE PLANNING)
Advance Care Planning (ACP) Provider Note - Comprehensive     Date of ACP Conversation: 12/21/20  Persons included in Conversation:  patient  Length of ACP Conversation in minutes:  16 minutes    Authorized Decision Maker (if patient is incapable of making informed decisions): This person is:  Next of Kin by law (only applies in absence of a Healthcare Power of [facility] or [address] [place] [address])     Narayan Mcrae, daughter, cellphone number [phone]     General ACP for ALL Patients with Decision Making Capacity:   Importance of advance care planning, including choosing a healthcare agent to communicate patient's healthcare decisions if patient lost the ability to make decisions, such as after a sudden illness or accident  Understanding of the healthcare agent role was assessed and information provided  Exploration of values, goals, and preferences if recovery is not expected, even with continued medical treatment in the event of: Imminent death  Severe, permanent brain injury  \"In these circumstances, what matters most to you? \"  Care focused more on comfort or quality of life. \"What, if any, treatments would you want to avoid? \" none  Opportunity offered to explore how cultural, Protestant, spiritual, or personal beliefs would affect decisions for future care         For Serious or Chronic Illness:  Understanding of medical condition    Understanding of CPR, goals and expected outcomes, benefits and burdens discussed. Information on CPR success rates provided (e.g. for CPR in hospital, survival to d/c at two weeks is 22%, for chronically ill or elderly/frail survival is less than 3%); Individual asked to communicate understanding of information in his/her own words.   Explored fears and concerns regarding CPR or possible outcomes    Interventions Provided:  Recommended completion of Advance Directive form after review of ACP materials and conversation with prospective healthcare agent

## 2020-12-21 NOTE — DISCHARGE SUMMARY
Hospitalist Discharge Summary     Patient ID:  Reji Richardson  [id number]  33 y.o.  1945    PCP on record: Jonathan Reyes MD    Admit date: 12/20/2020  Discharge date and time: 12/21/2020      Admission Diagnoses: Suspected cerebrovascular accident (CVA) [R09.89]    Discharge Diagnoses: Active Problems:    Suspected cerebrovascular accident (CVA) (12/20/2020)           Hospital Course:      Lightheadedness, POA, resolved - likely presyncope d.t hypovolemia. Stroke ruled out  Acute onset dizziness, blurry vision that resolved on presentation to the ER. CT head interval independently negative for acute intracranial hemorrhage. CTA head and neck with a technically limited studyno evidence for central large vessel arterial occlusion. Aspirin, Plavix, statin  MRI brain without contrast did not show CVA. TTE was performed, results pending at the time of discharge. Resume aspirin. A1C 5.7  %    Does not need PT,OT evaluation. Seen by teleneurologist.   Does not need neurology evaluation as she does not have CVA. Symptoms likely presyncope d/t hypovolemia.        Acute exacerbation of moderate persistent asthma POA  Report wheezing for past 5 days. Saturating well on room air, not in respiratory distress. CXR interpreted independentlyshows no acute process. Rapid Covid test negative. Patient does not take influenza vaccine. Improved with nebs, steroids. Discharged home with short course of steroids, nebs.       Hypertension POA  Hyperlipidemia POA    Lab Results   Component Value Date/Time    Cholesterol, total 252 (H) 12/21/2020 05:26 AM    HDL Cholesterol 58 12/21/2020 05:26 AM    LDL, calculated 183.2 (H) 12/21/2020 05:26 AM    VLDL, calculated 10.8 12/21/2020 05:26 AM    Triglyceride 54 12/21/2020 05:26 AM    CHOL/HDL Ratio 4.3 12/21/2020 05:26 AM     Statin not tolerated by patient due to myalgia. Fenofibrate micronized 130 mg started. Home antihypertensives resumed.        Elevated creatinine POA   BUN/Cr 24/1. 22. Unclear baseline. Improved to 1.1 with hydration. Outpatient follow up with PCP. Could have ckd due to long standing hypertension           Follow-up  Counseled on Lifestyle modifications, AHA Diet ,weight loss strategies. CONSULTATIONS:  IP CONSULT TO HOSPITALIST  IP CONSULT TO NEUROLOGY    Excerpted HPI from H&P of Karine Corona MD:  76 y.o.  female with PMH of HTN, HLD who presents to ED with c/o blurred vision, dizziness. Patient reports acute onset of blurred vision, dizziness described as \"my head felt weird. I felt like I was about to pass out and felt off balance \"that began around 1:30 PM this afternoon when she was cooking. Symptoms have completely resolved on presentation to the ER. She denies any focal weakness or numbness, dysarthria, dysphagia, hearing loss, tinnitus, facial asymmetry. She lives at home with her grandson. Works part-time as a nurse at [employer]. Reports wheezing, nonproductive cough for the past 5 days which she feels is usual for her asthma exacerbation. Reports that usually occurs with cold weather. Denies any fevers or chills. No known exposure to COVID-19. Has not taken an influenza vaccination this season, she never takes influenza vaccinations. Non-smoker  No alcohol use  No illicit drug use  Ambulatory independently    ______________________________________________________________________  DISCHARGE SUMMARY/HOSPITAL COURSE:  for full details see H&P, daily progress notes, labs, consult notes. Visit Vitals  BP (!) 154/65 (BP 1 Location: Right arm, BP Patient Position: At rest)   Pulse 81   Temp 98.4 °F (36.9 °C)   Resp 19   Wt 116.3 kg (256 lb 8 oz)   SpO2 100%       Patient seen and examined by me on discharge day. Pertinent Findings:  Gen:    Not in distress  Chest: Mild exp wheezing  CVS:   Regular rhythm.   No edema  Abd:  Soft, not distended, not tender  Neuro: Alert with good insight. Oriented to person, place, and time   _______________________________________________________________________  DISCHARGE MEDICATIONS:   Current Discharge Medication List      START taking these medications    Details   fenofibrate micronized (ANTARA) 130 mg capsule Take 1 Cap by mouth every morning. Qty: 30 Cap, Refills: 0      predniSONE (DELTASONE) 20 mg tablet Take 40 mg by mouth daily (with breakfast). Qty: 10 Tab, Refills: 0      albuterol (ACCUNEB) 1.25 mg/3 mL nebu 3 mL by Nebulization route every six (6) hours as needed for Wheezing, Shortness of Breath or Respiratory Distress. Qty: 30 Nebule, Refills: 0      fluticasone propion-salmeteroL (Advair Diskus) 100-50 mcg/dose diskus inhaler Take 1 Puff by inhalation every twelve (12) hours. Qty: 1 Inhaler, Refills: 1         CONTINUE these medications which have NOT CHANGED    Details   montelukast (SINGULAIR) 10 mg tablet       hydroCHLOROthiazide (HYDRODIURIL) 25 mg tablet       amLODIPine (NORVASC) 5 mg tablet       aspirin delayed-release 81 mg tablet Take 81 mg by mouth daily. My Recommended Diet, Activity, Wound Care, and follow-up labs are listed in the patient's Discharge Insturctions which I have personally completed and reviewed.     _______________________________________________________________________  DISPOSITION:     Home with Family: x   Home with HH/PT/OT/RN:    SNF/LTC:    JAROD:    OTHER:        Condition at Discharge:  Stable  _______________________________________________________________________  Follow up with:   PCP : Shellie Araujo MD  Follow-up Information     Follow up With Specialties Details Why Contact Info    Shellie Araujo MD Family Medicine On 12/22/2020 Your appointment time is 3:30, Bring a MASK, Please arrive 15 mins early, Bring  INS card, picture ID,and discharge papers, Please keep this appointment [address] [place], [place] [address] Center  On 12/22/2020 Office will call with appointment date and time. , if no call please call office by 12 noon [id number] [address] Rayo  [facility] [address]              Total time in minutes spent coordinating this discharge (includes going over instructions, follow-up, prescriptions, and preparing report for sign off to her PCP) : 32minutes    Signed:  Evan Jacobs MD

## 2020-12-21 NOTE — PROGRESS NOTES
Reason for Admission:   Suspected CVA                   RUR Score:   5%                  Plan for utilizing home health:    discuss with patient the need for possible HH. Patient refuse states she doesn't believes she needs that services. PCP: First and Last name:   Dr. Milly Cao   Name of Practice: [facility]   Are you a current patient: Yes/No: Yes    Approximate date of last visit: 12/2019    Can you participate in a virtual visit with your PCP:  Yes                     Current Advanced Directive/Advance Care Plan:  Patient has legal next of kin daughters listed. Transition of Care Plan:          Core Measure patient. CM opened case for assessment of D/C planning needs, CM reviewed chart. CM spoke with patient. Confirm demographics. Lives with 2 grandson in house. No DME. Patient drives. States last PCP visits was approximately one year ago. Use [facility] on [address] and [facility] [place] [facility]. discuss OBS and MOONS notification with patient. Copy provided to patient and copy on chart. On discharge patient freya Puri is picking her up. PCP appointment 12/22/2020 @ 3:30. Neurology appointment  1/8/20 @ 1:00pm.    Patient ready for discharge from CM standpoint please call with any questions. Care Management Interventions  PCP Verified by CM:  Yes  Mode of Transport at Discharge: Self  Transition of Care Consult (CM Consult): Discharge Planning  Health Maintenance Reviewed: Yes  Confirm Follow Up Transport: Self  The Plan for Transition of Care is Related to the Following Treatment Goals : neurology and PCP  The Patient and/or Patient Representative was Provided with a Choice of Provider and Agrees with the Discharge Plan?: Yes  Name of the Patient Representative Who was Provided with a Choice of Provider and Agrees with the Discharge Plan: patient and provider  Freedom of Choice List was Provided with Basic Dialogue that Supports the Patient's Individualized Plan of Care/Goals, Treatment Preferences and Shares the Quality Data Associated with the Providers?: Yes  Discharge Location  Discharge Placement: [address]  [phone]

## 2020-12-21 NOTE — ED NOTES
Pt remains in room resting on inpatient stretcher in no acute distress. Notified patient that meal tray should be here for her shortly.

## 2020-12-21 NOTE — PROGRESS NOTES
Speech pathology note  SLP orders were cancelled. Note MRI showed no acute stroke, patient passed the STAND, and NIHSS=0. Please re-consult if any SLP needs. Thank you.     Yenni Harris., CCC-SLP

## 2020-12-21 NOTE — ED NOTES
Pt given diabetic meal tray along with washcloth and toothbrush/toothpaste. Pt resting comfortably in bed.

## 2020-12-21 NOTE — ED NOTES
Bedside and Verbal shift change report given to Guille Jade (oncoming nurse) by Myriam Cade RN (offgoing nurse). Report included the following information SBAR, Kardex, ED Summary, Intake/Output, MAR and Recent Results. Pt bg goal 100-180. Labs at 0400 and 0600.

## 2020-12-21 NOTE — ED NOTES
Patient resting in bed comfortably and in no acute distress. Bedside and Verbal shift change report given to Gustabo Garcia (oncoming nurse) by Boris Davis (offgoing nurse). Report included the following information SBAR, Kardex, ED Summary, [facility] and Recent Results.

## 2020-12-21 NOTE — PROGRESS NOTES
.Occupational Therapy  Order received and chart reviewed, attempted to see however patient currently having ECHO at bedside. Will continue to follow.    Noted order cancelled as patient at baseline and CT/MRI negative, also discussed with MD. Harry Arellano, OTR/L

## 2021-01-08 ENCOUNTER — VIRTUAL VISIT (OUTPATIENT)
Dept: NEUROLOGY | Age: 76
End: 2021-01-08
Payer: MEDICARE

## 2021-01-08 DIAGNOSIS — E56.9 HYPOVITAMINOSIS: ICD-10-CM

## 2021-01-08 DIAGNOSIS — G45.9 TIA (TRANSIENT ISCHEMIC ATTACK): Primary | ICD-10-CM

## 2021-01-08 DIAGNOSIS — R42 DIZZINESS: ICD-10-CM

## 2021-01-08 PROBLEM — R09.89 SUSPECTED CEREBROVASCULAR ACCIDENT (CVA): Status: RESOLVED | Noted: 2020-12-20 | Resolved: 2021-01-08

## 2021-01-08 PROCEDURE — G8399 PT W/DXA RESULTS DOCUMENT: HCPCS | Performed by: PSYCHIATRY & NEUROLOGY

## 2021-01-08 PROCEDURE — 1090F PRES/ABSN URINE INCON ASSESS: CPT | Performed by: PSYCHIATRY & NEUROLOGY

## 2021-01-08 PROCEDURE — G8432 DEP SCR NOT DOC, RNG: HCPCS | Performed by: PSYCHIATRY & NEUROLOGY

## 2021-01-08 PROCEDURE — G8417 CALC BMI ABV UP PARAM F/U: HCPCS | Performed by: PSYCHIATRY & NEUROLOGY

## 2021-01-08 PROCEDURE — G8536 NO DOC ELDER MAL SCRN: HCPCS | Performed by: PSYCHIATRY & NEUROLOGY

## 2021-01-08 PROCEDURE — G8427 DOCREV CUR MEDS BY ELIG CLIN: HCPCS | Performed by: PSYCHIATRY & NEUROLOGY

## 2021-01-08 PROCEDURE — 99205 OFFICE O/P NEW HI 60 MIN: CPT | Performed by: PSYCHIATRY & NEUROLOGY

## 2021-01-08 PROCEDURE — 1101F PT FALLS ASSESS-DOCD LE1/YR: CPT | Performed by: PSYCHIATRY & NEUROLOGY

## 2021-01-08 PROCEDURE — 3017F COLORECTAL CA SCREEN DOC REV: CPT | Performed by: PSYCHIATRY & NEUROLOGY

## 2021-01-17 NOTE — PROGRESS NOTES
Neurology Consult Note  Rodger Meckel was seen by synchronous (real-time) audio-video technology on 01/08/21. Consent:  She  is aware that this patient-initiated Telehealth encounter is a billable service, with coverage as determined by her insurance carrier. She is aware that she may receive a bill and has provided verbal consent to proceed: Yes    I was in the office while conducting this encounter. Pursuant to the emergency declaration under the [address] [place] [address] [facility] authority and the [employer] Act, this Virtual  Visit was conducted, with patient's consent, to reduce the patient's risk of exposure to COVID-19 and provide continuity of care for an established patient. Services were provided through a video synchronous discussion virtually to substitute for in-person clinic visit. HISTORY PROVIDED BY: patient    Chief Complaint:   Chief Complaint   Patient presents with    New Patient    Dizziness     slurred vision      Subjective:    Rodger Meckel is a 76 y.o. right handed female who presents in consultation for dizziness, blurry vision  This is a 40-year-old right-handed female who was admitted because of sudden onset of dizziness, blurry vision. According to patient, she was in her baseline of health when on the day of presentation, while she was cooking at home, suddenly she felt dizzy as if she was going to blackout and there was blurry vision. Patient lives at home with her grandson, at this time, patient had to go to emergency room. By time patient got to the emergency room, the symptoms appear to have improved. Initial CT scan of the head and subsequent MRI of the brain was unremarkable for stroke event. She was subsequently discharged to follow-up with neurologist.  Patient says since discharge from the hospital, she has not had any further symptoms.   She denies loss of consciousness, dysphagia or odynophagia. Review of Systems - General ROS: negative for - fatigue, sleep disturbance or weight loss  Psychological ROS: positive for - anxiety  Ophthalmic ROS: negative for - blurry vision, decreased vision or double vision  ENT ROS: negative for - headaches, tinnitus or vertigo  Allergy and Immunology ROS: negative  Hematological and Lymphatic ROS: negative  Endocrine ROS: negative  Respiratory ROS: no cough, shortness of breath, or wheezing  Cardiovascular ROS: no chest pain or dyspnea on exertion  Gastrointestinal ROS: no abdominal pain, change in bowel habits, or black or bloody stools  Genito-Urinary ROS: no dysuria, trouble voiding, or hematuria  Musculoskeletal ROS: negative  Neurological ROS: negative for - dizziness, gait disturbance, headaches, impaired coordination/balance, memory loss, numbness/tingling, visual changes or weakness  Dermatological ROS: negative        Past Medical History:   Diagnosis Date    Hypertension       History reviewed. No pertinent surgical history.    Social History     Socioeconomic History    Marital status:      Spouse name: Not on file    Number of children: Not on file    Years of education: Not on file    Highest education level: Not on file   Occupational History    Not on file   Social Needs    Financial resource strain: Not on file    Food insecurity     Worry: Not on file     Inability: Not on file    Transportation needs     Medical: Not on file     Non-medical: Not on file   Tobacco Use    Smoking status: Never Smoker    Smokeless tobacco: Never Used   Substance and Sexual Activity    Alcohol Use     Frequency: Never    Drug use: Not on file    Sexual activity: Not on file   Lifestyle    Physical activity     Days per week: Not on file     Minutes per session: Not on file    Stress: Not on file   Relationships    Social connections     Talks on phone: Not on file     Gets together: Not on file     Attends Baptism service: Not on file     Active member of club or organization: Not on file     Attends meetings of clubs or organizations: Not on file     Relationship status: Not on file    Intimate partner violence     Fear of current or ex partner: Not on file     Emotionally abused: Not on file     Physically abused: Not on file     Forced sexual activity: Not on file   Other Topics Concern    Not on file   Social History Narrative    Not on file     History reviewed. No pertinent family history. Objective:   ROS  As per HPI    Allergies   Allergen Reactions    Lisinopril Cough        Meds:  Outpatient Medications Prior to Visit   Medication Sig Dispense Refill    fenofibrate micronized (ANTARA) 130 mg capsule Take 1 Cap by mouth every morning. 30 Cap 0    predniSONE (DELTASONE) 20 mg tablet Take 40 mg by mouth daily (with breakfast). 10 Tab 0    albuterol (ACCUNEB) 1.25 mg/3 mL nebu 3 mL by Nebulization route every six (6) hours as needed for Wheezing, Shortness of Breath or Respiratory Distress. 30 Nebule 0    fluticasone propion-salmeteroL (Advair Diskus) 100-50 mcg/dose diskus inhaler Take 1 Puff by inhalation every twelve (12) hours. 1 Inhaler 1    montelukast (SINGULAIR) 10 mg tablet       hydroCHLOROthiazide (HYDRODIURIL) 25 mg tablet       amLODIPine (NORVASC) 5 mg tablet       aspirin delayed-release 81 mg tablet Take 81 mg by mouth daily. No facility-administered medications prior to visit. Imaging:  MRI Results (most recent):  Results from [place] encounter on 12/20/20   MRI BRAIN WO CONT    Narrative EXAM: MRI BRAIN WO CONT    INDICATION: Acute onset dizziness and blurry vision over the past 24 hours. COMPARISON: CT head and CT angiography head on 12/20/2020. CONTRAST: None. TECHNIQUE: MRI brain without contrast.  Multiplanar multisequence acquisition without contrast of the brain. FINDINGS:  The ventricles are normal in size and position.  There is no acute infarct,  hemorrhage, extra-axial fluid collection, or mass effect. Chronic microvascular  ischemic disease is mild and greatest in the bilateral frontal subcortical white  matter. Expected arterial flow-voids are present. Medial temporal lobes are symmetric. Sagittal midline structures are within  normal limits. There is 1.3 cm nodular hyperplasia of the adenoid soft tissues. Paranasal sinus inflammation includes air-fluid level in the right maxillary  sinus. Evidence of bilateral cataract surgery. Impression IMPRESSION:     1. No acute infarct. Mild chronic microvascular ischemic disease. 2. Paranasal sinus inflammation, probable right maxillary sinusitis. 3. Adenoid nodular hypertrophy versus nasopharyngeal 1.3 cm mass. Consider  nonemergent] visualization. CT Results (most recent):  Results from Hospital Encounter encounter on 12/20/20   CTA CODE NEURO HEAD AND NECK W CONT    Narrative CLINICAL HISTORY: Blurry vision    EXAMINATION:  CT ANGIOGRAPHY HEAD AND NECK     COMPARISON: CT head 12/20/2020    TECHNIQUE:  Following the uneventful administration of iodinated contrast  material, axial CT angiography of the head and neck was performed. Delayed axial  images through the head were also obtained. Coronal and sagittal reconstructions  were obtained. Manual postprocessing of images was performed. 3-D  Sagittal  maximal intensity projection images were obtained. 3-D Coronal maximal  intensity projections were obtained. CT dose reduction was achieved through use  of a standardized protocol tailored for this examination and automatic exposure  control for dose modulation. FINDINGS:    Delayed contrast-enhanced head CT:    The ventricles are midline without hydrocephalus. There is no acute intra or  extra-axial hemorrhage. Mild bilateral subcortical and periventricular areas of  patchy low attenuation is nonspecific but likely related to changes of chronic  small vessel ischemic disease.  The basal cisterns are clear. Moderate  opacification of the frontal sinuses and bilateral ethmoidal air cells with  air-fluid levels in the maxillary sinuses. CTA NECK:    Cervical right internal carotid artery: Patent with proximal atherosclerosis  causing probably less than 50% stenosis by NASCET criteria. Cervical left internal carotid artery: Patent with proximal atherosclerosis  causing probably no significant stenosis by NASCET criteria. Right vertebral artery: Likely patent    Left vertebral artery: Likely patent    Mild to moderate cervical spondylosis    5 mm right upper lobe lung nodule (image 6). CTA HEAD: Very technically limited exam due to poor bolus timing and arterial  contrast opacification. No evidence for central large vessel occlusion. Major dural venous sinuses appear patent      Impression Impression:    Very technically limited evaluation of the arteries of the head and neck due to  poor bolus timing and arterial contrast opacification. Within these limitations,  no evidence for central large vessel arterial occlusion. Major dural venous sinuses appear patent. 5 mm right upper lobe lung nodule. RECOMMENDATIONS FOR FOLLOW-UP AND MANAGEMENT OF NODULES SMALLER THAN 8 MM  DETECTED INCIDENTALLY AT NONSCREENING CT:    LOW-RISK PATIENTS:    LESS THAN OR EQUAL TO 4 MM:  No follow-up needed. GREATER THAN 4-6 MM:  Follow-up CT at 12 mo; if unchanged, no further follow-up. GREATER THAN 6-8 MM:  Initial follow-up CT at 6-12 months then at 18-24 months  if no change. GREATER THAN 8 MM:  Follow-up CT at around 3, 9, and 24 months, dynamic  contrast-enhanced CT, PET, and/or biopsy. HIGH-RISK PATIENTS:    LESS THAN OR EQUAL TO 4 MM:  Follow-up CT at 12 months; if unchanged, no further  follow-up. GREATER THAN 4-6 MM:  Initial follow-up CT at 6-12 months then at 18-24 months  if no change.   GREATER THAN 6-8 MM:  Initial follow-up CT at 3-6 months then at 9-12 and 24  months if no change. GREATER THAN 8 MM:  Same as for low-risk patient. Reviewed records in [facility] tab today    Lab Review   Results for orders placed or performed during the hospital encounter of 12/20/20   CBC WITH AUTOMATED DIFF   Result Value Ref Range    WBC 10.5 3.6 - 11.0 K/uL    RBC 4.70 3.80 - 5.20 M/uL    HGB 13.8 11.5 - 16.0 g/dL    HCT 40.8 35.0 - 47.0 %    MCV 86.8 80.0 - 99.0 FL    MCH 29.4 26.0 - 34.0 PG    MCHC 33.8 30.0 - 36.5 g/dL    RDW 13.5 11.5 - 14.5 %    PLATELET 195 946 - 728 K/uL    MPV 11.3 8.9 - 12.9 FL    NRBC 0.0 0  WBC    ABSOLUTE NRBC 0.00 0.00 - 0.01 K/uL    NEUTROPHILS 47 32 - 75 %    LYMPHOCYTES 30 12 - 49 %    MONOCYTES 9 5 - 13 %    EOSINOPHILS 10 (H) 0 - 7 %    BASOPHILS 4 (H) 0 - 1 %    IMMATURE GRANULOCYTES 0 %    ABS. NEUTROPHILS 4.9 1.8 - 8.0 K/UL    ABS. LYMPHOCYTES 3.2 0.8 - 3.5 K/UL    ABS. MONOCYTES 0.9 0.0 - 1.0 K/UL    ABS. EOSINOPHILS 1.1 (H) 0.0 - 0.4 K/UL    ABS. BASOPHILS 0.4 (H) 0.0 - 0.1 K/UL    ABS. IMM.  GRANS. 0.0 K/UL    DF MANUAL      RBC COMMENTS NORMOCYTIC, NORMOCHROMIC      WBC COMMENTS REACTIVE LYMPHS     METABOLIC PANEL, BASIC   Result Value Ref Range    Sodium 141 136 - 145 mmol/L    Potassium 3.2 (L) 3.5 - 5.1 mmol/L    Chloride 100 97 - 108 mmol/L    CO2 30 21 - 32 mmol/L    Anion gap 11 5 - 15 mmol/L    Glucose 92 65 - 100 mg/dL    BUN 24 (H) 6 - 20 MG/DL    Creatinine 1.22 (H) 0.55 - 1.02 MG/DL    BUN/Creatinine ratio 20 12 - 20      GFR est AA 52 (L) >60 ml/min/1.73m2    GFR est non-AA 43 (L) >60 ml/min/1.73m2    Calcium 9.1 8.5 - 10.1 MG/DL   TROPONIN I   Result Value Ref Range    Troponin-I, Qt. <0.05 <0.05 ng/mL   URINALYSIS W/ REFLEX CULTURE    Specimen: Urine   Result Value Ref Range    Color YELLOW/STRAW      Appearance CLEAR CLEAR      Specific gravity 1.015 1.003 - 1.030      pH (UA) 7.0 5.0 - 8.0      Protein Negative NEG mg/dL    Glucose Negative NEG mg/dL    Ketone Negative NEG mg/dL    Bilirubin Negative NEG      Blood Negative NEG      Urobilinogen 0.2 0.2 - 1.0 EU/dL    Nitrites Negative NEG      Leukocyte Esterase Negative NEG      WBC 0-4 0 - 4 /hpf    RBC 0-5 0 - 5 /hpf    Epithelial cells FEW FEW /lpf    Bacteria Negative NEG /hpf    UA:UC IF INDICATED CULTURE NOT INDICATED BY UA RESULT CNI     SARS-COV-2   Result Value Ref Range    Specimen source Nasopharyngeal      Specimen source NP SWAB     COVID-19 rapid test Not detected NOTD      Specimen type NP Swab      Health status Symptomatic Testing     LIPID PANEL   Result Value Ref Range    LIPID PROFILE          Cholesterol, total 252 (H) <200 MG/DL    Triglyceride 54 <150 MG/DL    HDL Cholesterol 58 MG/DL    LDL, calculated 183.2 (H) 0 - 100 MG/DL    VLDL, calculated 10.8 MG/DL    CHOL/HDL Ratio 4.3 0.0 - 5.0     HEMOGLOBIN A1C WITH EAG   Result Value Ref Range    Hemoglobin A1c 5.7 (H) 4.0 - 5.6 %    Est. average glucose 117 mg/dL   CBC WITH AUTOMATED DIFF   Result Value Ref Range    WBC 8.9 3.6 - 11.0 K/uL    RBC 4.34 3.80 - 5.20 M/uL    HGB 12.7 11.5 - 16.0 g/dL    HCT 37.3 35.0 - 47.0 %    MCV 85.9 80.0 - 99.0 FL    MCH 29.3 26.0 - 34.0 PG    MCHC 34.0 30.0 - 36.5 g/dL    RDW 13.4 11.5 - 14.5 %    PLATELET 488 345 - 930 K/uL    MPV 11.1 8.9 - 12.9 FL    NRBC 0.0 0  WBC    ABSOLUTE NRBC 0.00 0.00 - 0.01 K/uL    NEUTROPHILS 81 (H) 32 - 75 %    LYMPHOCYTES 16 12 - 49 %    MONOCYTES 2 (L) 5 - 13 %    EOSINOPHILS 0 0 - 7 %    BASOPHILS 0 0 - 1 %    IMMATURE GRANULOCYTES 1 (H) 0.0 - 0.5 %    ABS. NEUTROPHILS 7.2 1.8 - 8.0 K/UL    ABS. LYMPHOCYTES 1.5 0.8 - 3.5 K/UL    ABS. MONOCYTES 0.2 0.0 - 1.0 K/UL    ABS. EOSINOPHILS 0.0 0.0 - 0.4 K/UL    ABS. BASOPHILS 0.0 0.0 - 0.1 K/UL    ABS. IMM.  GRANS. 0.1 (H) 0.00 - 0.04 K/UL    DF AUTOMATED     METABOLIC PANEL, BASIC   Result Value Ref Range    Sodium 139 136 - 145 mmol/L    Potassium 3.7 3.5 - 5.1 mmol/L    Chloride 103 97 - 108 mmol/L    CO2 27 21 - 32 mmol/L    Anion gap 9 5 - 15 mmol/L    Glucose 147 (H) 65 - 100 mg/dL    BUN 20 6 - 20 MG/DL    Creatinine 1.11 (H) 0.55 - 1.02 MG/DL    BUN/Creatinine ratio 18 12 - 20      GFR est AA 58 (L) >60 ml/min/1.73m2    GFR est non-AA 48 (L) >60 ml/min/1.73m2    Calcium 9.2 8.5 - 10.1 MG/DL   MAGNESIUM   Result Value Ref Range    Magnesium 1.9 1.6 - 2.4 mg/dL   PHOSPHORUS   Result Value Ref Range    Phosphorus 2.5 (L) 2.6 - 4.7 MG/DL   GLUCOSE, POC   Result Value Ref Range    Glucose (POC) 102 (H) 65 - 100 mg/dL    Performed by Veto Gerard \"Elizabeth\"    GLUCOSE, POC   Result Value Ref Range    Glucose (POC) 147 (H) 65 - 100 mg/dL    Performed by Veto Gerard \"Elizabeth\"    GLUCOSE, POC   Result Value Ref Range    Glucose (POC) 154 (H) 65 - 100 mg/dL    Performed by Enoch Casiano    GLUCOSE, POC   Result Value Ref Range    Glucose (POC) 133 (H) 65 - 100 mg/dL    Performed by Andi Anand    EKG, 12 LEAD, INITIAL   Result Value Ref Range    Ventricular Rate 73 BPM    Atrial Rate 73 BPM    P-R Interval 214 ms    QRS Duration 94 ms    Q-T Interval 420 ms    QTC Calculation (Bezet) 462 ms    Calculated P Axis 43 degrees    Calculated R Axis 47 degrees    Calculated T Axis 53 degrees    Diagnosis       Sinus rhythm with 1st degree AV block  Otherwise normal ECG  No previous ECGs available  Confirmed by Neeru Olivares M.D., Luisa Dura ([id number]) on 12/21/2020 8:17:19 AM     ECHO ADULT COMPLETE   Result Value Ref Range    IVSd 0.97 (A) 0.6 - 0.9 cm    LVIDd 4.15 3.9 - 5.3 cm    LVIDs 2.34 cm    LVOT d 2.05 cm    LVPWd 1.38 (A) 0.6 - 0.9 cm    LV Ejection Fraction MOD 4C 71 percent    LV ED Vol A4C 109.48 mL    LV ES Vol A4C 31.74 mL    LVOT Peak Gradient 3.19 mmHg    LVOT Peak Velocity 89.33 cm/s    Left Atrium Major Axis 2.72 cm    LA Area 4C 11.80 cm2    LA Vol 4C 23.38 22 - 52 mL    Right Atrial Area 4C 15.38 cm2    Aortic Valve Area by Planimetry 2.79 cm2    Mitral Valve Area by Planimetry 6.43 cm2    MV A Charles 52.59 cm/s    Mitral Valve E Wave Deceleration Time 161.00 ms    MV E Charles 27.89 cm/s    Mitral Valve Pressure Half-time 46.69 ms    MVA (PHT) 4.71 cm2    MV Peak Gradient 2.01 mmHg    MV Mean Gradient 0.72 mmHg    Mitral Valve Pressure Half-time 38.31 ms    Mitral Valve Max Velocity 70.94 cm/s    Mitral Valve Annulus Velocity Time Integral 13.04 cm    MVA (PHT) 5.74 cm2    Pulmonic Valve Systolic Peak Instantaneous Gradient 3.00 mmHg    Pulmonic Regurgitant End Max Velocity 86.52 cm/s    Ao Root D 2.69 cm    MV E/A 0.53     LV Mass .6 67 - 162 g    LV Mass AL Index 80.6 43 - 95 g/m2    Left Atrium Minor Axis 1.29 cm    LA Vol Index 11.11 16 - 28 ml/m2    LVED Vol Index A4C 52.0 mL/m2    LVES Vol Index A4C 15.1 mL/m2        Exam:  There were no vitals taken for this visit. General:  Alert, cooperative, no distress. Head:  Normocephalic, without obvious abnormality, atraumatic. Respiratory:  Heart:   Non labored breathing  deferred   Neck:   deferred   Extremities: no cyanosis or edema. Pulses: deferred. Neurologic:  MS: Alert and oriented x 4, speech intact. Language intact, able to name, repeat, and follow all commands. Attention and fund of knowledge appropriate. Recent and remote memory intact. Cranial Nerves:  II: visual fields deferred   II: pupils Equal, round   II: optic disc deferred   III,VII: ptosis none   III,IV,VI: extraocular muscles  EOMI, no nystagmus or diplopia   V: facial light touch sensation  deferred   VII: facial muscle function   symmetric   VIII: hearing intact   IX: soft palate elevation  deferred   XI: trapezius strength   good neck movement   XI: sternocleidomastoid strength  good shrug   XII: tongue  Midline     Motor: normal bulk , no tremor              Strength: Moves all extremities  Sensory: Deferred  Coordination: FTN and DAIN intact. Reflexes: Deferred           Assessment/Plan       ICD-10-CM ICD-9-CM    1.  TIA (transient ischemic attack)  G45.9 435.9 SED RATE (ESR)      LUCILLE, DIRECT, W/REFLEX      ANGIOTENSIN CONVERTING ENZYME CK      CRP, HIGH SENSITIVITY      HOMOCYSTEINE, PLASMA      VITAMIN D, 25 HYDROXY   2. Dizziness  R42 780.4 RHEUMATOID FACTOR, QL      SED RATE (ESR)      LUCILLE, DIRECT, W/REFLEX      ANGIOTENSIN CONVERTING ENZYME      CK      CRP, HIGH SENSITIVITY      HOMOCYSTEINE, PLASMA      VITAMIN D, 25 HYDROXY   3. Hypovitaminosis  E56.9 269.2 VITAMIN B12      VITAMIN D, 25 HYDROXY   Plan: This is a 80-year-old black female who was hospitalized for possible CVA, neuro imaging was unremarkable, will most likely dealing with TIA. I will continue patient on aspirin 81 mg p.o. daily  I will obtain blood work for autoimmune work-up and stroke work-up; CRP, CK, vitamin D, vitamin B12, ESR    Follow-up and Dispositions    · Return in about 2 months (around 3/8/2021). Thank you very much for this consultation.     Signed:  Clarice Byrd MD  1/8/2021

## 2021-01-31 LAB
25(OH)D3+25(OH)D2 SERPL-MCNC: 57 NG/ML (ref 30–100)
ACE SERPL-CCNC: 33 U/L (ref 14–82)
ANA SER QL: NEGATIVE
CK SERPL-CCNC: 216 U/L (ref 32–182)
CRP SERPL HS-MCNC: 1.05 MG/L (ref 0–3)
ERYTHROCYTE [SEDIMENTATION RATE] IN BLOOD BY WESTERGREN METHOD: 3 MM/HR (ref 0–40)
HCYS SERPL-SCNC: 13.3 UMOL/L (ref 0–19.2)
RHEUMATOID FACT SERPL-ACNC: <10 IU/ML (ref 0–13.9)
VIT B12 SERPL-MCNC: 494 PG/ML (ref 232–1245)

## 2021-03-08 ENCOUNTER — VIRTUAL VISIT (OUTPATIENT)
Dept: NEUROLOGY | Age: 76
End: 2021-03-08

## 2022-03-20 PROBLEM — G45.9 TIA (TRANSIENT ISCHEMIC ATTACK): Status: ACTIVE | Noted: 2021-01-08

## 2023-05-11 RX ORDER — FENOFIBRATE 130 MG/1
CAPSULE ORAL
COMMUNITY
Start: 2020-12-21

## 2023-05-11 RX ORDER — FLUTICASONE PROPIONATE AND SALMETEROL 100; 50 UG/1; UG/1
1 POWDER RESPIRATORY (INHALATION) EVERY 12 HOURS
COMMUNITY
Start: 2020-12-21

## 2023-05-11 RX ORDER — HYDROCHLOROTHIAZIDE 25 MG/1
TABLET ORAL
COMMUNITY
Start: 2020-09-21

## 2023-05-11 RX ORDER — AMLODIPINE BESYLATE 5 MG/1
TABLET ORAL
COMMUNITY
Start: 2020-09-21

## 2023-05-11 RX ORDER — MONTELUKAST SODIUM 10 MG/1
TABLET ORAL
COMMUNITY
Start: 2020-09-21

## 2023-05-11 RX ORDER — ALBUTEROL SULFATE 1.25 MG/3ML
SOLUTION RESPIRATORY (INHALATION) EVERY 6 HOURS PRN
COMMUNITY
Start: 2020-12-21

## 2023-05-11 RX ORDER — ASPIRIN 81 MG/1
81 TABLET ORAL DAILY
COMMUNITY

## 2023-05-11 RX ORDER — PREDNISONE 20 MG/1
TABLET ORAL
COMMUNITY
Start: 2020-12-21

## 2024-04-24 ENCOUNTER — HOSPITAL ENCOUNTER (EMERGENCY)
Facility: HOSPITAL | Age: 79
Discharge: HOME | End: 2024-04-24
Payer: MEDICARE

## 2024-04-24 VITALS
RESPIRATION RATE: 14 BRPM | HEIGHT: 68 IN | SYSTOLIC BLOOD PRESSURE: 159 MMHG | BODY MASS INDEX: 32.43 KG/M2 | DIASTOLIC BLOOD PRESSURE: 68 MMHG | TEMPERATURE: 97.6 F | HEART RATE: 52 BPM | WEIGHT: 214 LBS | OXYGEN SATURATION: 99 %

## 2024-04-24 DIAGNOSIS — I10 UNCONTROLLED HYPERTENSION: Primary | ICD-10-CM

## 2024-04-24 LAB
ALBUMIN SERPL-MCNC: 3.6 G/DL (ref 3.5–5)
ALBUMIN/GLOB SERPL: 0.9 (ref 1.1–2.2)
ALP SERPL-CCNC: 90 U/L (ref 45–117)
ALT SERPL-CCNC: 18 U/L (ref 12–78)
ANION GAP SERPL CALC-SCNC: 6 MMOL/L (ref 5–15)
AST SERPL-CCNC: 22 U/L (ref 15–37)
BASOPHILS # BLD: 0.1 K/UL (ref 0–0.1)
BASOPHILS NFR BLD: 1 % (ref 0–1)
BILIRUB SERPL-MCNC: 0.5 MG/DL (ref 0.2–1)
BUN SERPL-MCNC: 20 MG/DL (ref 6–20)
BUN/CREAT SERPL: 17 (ref 12–20)
CALCIUM SERPL-MCNC: 9 MG/DL (ref 8.5–10.1)
CHLORIDE SERPL-SCNC: 104 MMOL/L (ref 97–108)
CO2 SERPL-SCNC: 31 MMOL/L (ref 21–32)
CREAT SERPL-MCNC: 1.16 MG/DL (ref 0.55–1.02)
DIFFERENTIAL METHOD BLD: NORMAL
EOSINOPHIL # BLD: 0.4 K/UL (ref 0–0.4)
EOSINOPHIL NFR BLD: 6 % (ref 0–7)
ERYTHROCYTE [DISTWIDTH] IN BLOOD BY AUTOMATED COUNT: 14 % (ref 11.5–14.5)
GLOBULIN SER CALC-MCNC: 4 G/DL (ref 2–4)
GLUCOSE SERPL-MCNC: 100 MG/DL (ref 65–100)
HCT VFR BLD AUTO: 40.8 % (ref 35–47)
HGB BLD-MCNC: 13.6 G/DL (ref 11.5–16)
IMM GRANULOCYTES # BLD AUTO: 0 K/UL (ref 0–0.04)
IMM GRANULOCYTES NFR BLD AUTO: 0 % (ref 0–0.5)
LYMPHOCYTES # BLD: 2.7 K/UL (ref 0.8–3.5)
LYMPHOCYTES NFR BLD: 37 % (ref 12–49)
MCH RBC QN AUTO: 29.4 PG (ref 26–34)
MCHC RBC AUTO-ENTMCNC: 33.3 G/DL (ref 30–36.5)
MCV RBC AUTO: 88.3 FL (ref 80–99)
MONOCYTES # BLD: 0.6 K/UL (ref 0–1)
MONOCYTES NFR BLD: 8 % (ref 5–13)
NEUTS SEG # BLD: 3.5 K/UL (ref 1.8–8)
NEUTS SEG NFR BLD: 48 % (ref 32–75)
NRBC # BLD: 0 K/UL (ref 0–0.01)
NRBC BLD-RTO: 0 PER 100 WBC
PLATELET # BLD AUTO: 254 K/UL (ref 150–400)
PMV BLD AUTO: 11.5 FL (ref 8.9–12.9)
POTASSIUM SERPL-SCNC: 3.8 MMOL/L (ref 3.5–5.1)
PROT SERPL-MCNC: 7.6 G/DL (ref 6.4–8.2)
RBC # BLD AUTO: 4.62 M/UL (ref 3.8–5.2)
SODIUM SERPL-SCNC: 141 MMOL/L (ref 136–145)
TROPONIN I SERPL HS-MCNC: 8 NG/L (ref 0–51)
WBC # BLD AUTO: 7.3 K/UL (ref 3.6–11)

## 2024-04-24 PROCEDURE — 93005 ELECTROCARDIOGRAM TRACING: CPT | Performed by: NURSE PRACTITIONER

## 2024-04-24 PROCEDURE — 85025 COMPLETE CBC W/AUTO DIFF WBC: CPT

## 2024-04-24 PROCEDURE — 36415 COLL VENOUS BLD VENIPUNCTURE: CPT

## 2024-04-24 PROCEDURE — 84484 ASSAY OF TROPONIN QUANT: CPT

## 2024-04-24 PROCEDURE — 80053 COMPREHEN METABOLIC PANEL: CPT

## 2024-04-24 PROCEDURE — 99284 EMERGENCY DEPT VISIT MOD MDM: CPT

## 2024-04-24 ASSESSMENT — HEART SCORE: ECG: NORMAL

## 2024-04-24 ASSESSMENT — PAIN SCALES - GENERAL: PAINLEVEL_OUTOF10: 0

## 2024-04-24 NOTE — ED PROVIDER NOTES
[facility] EMERGENCY DEPT  EMERGENCY DEPARTMENT ENCOUNTER       Pt Name: Nicolle Carrizales  MRN: [id number]  Birthdate 1945  Date of evaluation: 4/24/2024  Provider: RAJANI Hill NP   PCP: Alex Stephens MD  Note Started: 9:11 AM EDT 4/24/24     CHIEF COMPLAINT       Chief Complaint   Patient presents with    Hypertension        HISTORY OF PRESENT ILLNESS: 1 or more elements      History From: Patient  HPI Limitations: None     Nicolle Carrizales is a 79 y.o. female who presents for hypertension.  Patient states she awoke this morning and noticed her blood pressure was 200s over 100s.  States she has been taking her blood pressure daily.  Secondary to increase in her antihypertensives.  She reports blood pressure is elevated in the morning and usually decreased by afternoon.  She was concerned today stating she has a history of TIA and wanted to come to the ER.  Denies headache, dizziness, visual changes, numbness, tingling, extremity weakness, chest pain or shortness of breath.  She reports taking her amlodipine and hydrochlorothiazide prior to arrival today.       Nursing Notes were all reviewed and agreed with or any disagreements were addressed in the HPI.     REVIEW OF SYSTEMS      Review of Systems     Positives and Pertinent negatives as per HPI.    PAST HISTORY     Past Medical History:  Past Medical History:   Diagnosis Date    Hypertension        Past Surgical History:  No past surgical history on file.    Family History:  No family history on file.    Social History:  Social History     Tobacco Use    Smoking status: Never    Smokeless tobacco: Never       Allergies:  Allergies   Allergen Reactions    Lisinopril Cough       CURRENT MEDICATIONS      Previous Medications    ALBUTEROL (ACCUNEB) 1.25 MG/3ML NEBULIZER SOLUTION    Inhale into the lungs every 6 hours as needed    AMLODIPINE (NORVASC) 5 MG TABLET    ceived the following from [facility]: Outside name: amLODIPine (NORVASC) 5 mg

## 2024-04-24 NOTE — ED NOTES
Pt presents ambulatory to ED reporting having elevated BP readings this AM after using her at home monitor. Pt reports her PCP increasing her HTN meds recently and adding amlodipine on as well and has been on them for approximately 1 week. Pt currently denies any chest pain, SOB, dizziness, headaches, or vision chagnes . Pt is alert and oriented x 4, RR even and unlabored, skin is warm and dry. Assesment completed and pt updated on plan of care.     Emergency Department Nursing Plan of Care       The Nursing Plan of Care is developed from the Nursing assessment and Emergency Department Attending provider initial evaluation.  The plan of care may be reviewed in the “ED Provider note”.    The Plan of Care was developed with the following considerations:   Patient / Family readiness to learn indicated by:verbalized understanding  Persons(s) to be included in education: patient  Barriers to Learning/Limitations:None    Signed     Kimberly Magana RN    4/24/2024   9:13 AM

## 2024-04-24 NOTE — ED NOTES
Discharge instructions were given to the patient by Cynthia ACEVES. The patient left the Emergency Department ambulatory, alert and oriented and in no acute distress with 0 prescription. The patient was encouraged to call or return to the ED for worsening issues or problems and was encouraged to schedule a follow up appointment for continuing care. The patient verbalized understanding of discharge instructions and prescriptions, all questions were answered. The patient has no further concerns at this time.

## 2024-04-24 NOTE — DISCHARGE INSTRUCTIONS
It was a pleasure taking care of you at [facility] Emergency Department today.  We know that when you come to [facility], you are entrusting us with your health, comfort, and safety.  Our physicians and nurses honor that trust, and we truly appreciate the opportunity to care for you and your loved ones.      We also value our feedback.  If you receive a survey about your Emergency Department experience today, please fill it out.  We care about our patients' feedback, and we listen to what you have to say.  Thank you!

## 2024-04-24 NOTE — ED TRIAGE NOTES
Pt states she took her BP this morning and it was 200s/117. Pt states she has been checking BP prior to taking bp meds and it has been gradually increasing.     Denies any symptoms

## 2024-04-25 LAB
EKG ATRIAL RATE: 58 BPM
EKG DIAGNOSIS: NORMAL
EKG P AXIS: 68 DEGREES
EKG P-R INTERVAL: 226 MS
EKG Q-T INTERVAL: 444 MS
EKG QRS DURATION: 84 MS
EKG QTC CALCULATION (BAZETT): 435 MS
EKG R AXIS: 54 DEGREES
EKG T AXIS: 82 DEGREES
EKG VENTRICULAR RATE: 58 BPM

## 2024-04-25 PROCEDURE — 93010 ELECTROCARDIOGRAM REPORT: CPT | Performed by: SPECIALIST

## 2025-01-15 ENCOUNTER — APPOINTMENT (OUTPATIENT)
Facility: HOSPITAL | Age: 80
End: 2025-01-15
Payer: MEDICARE

## 2025-01-15 ENCOUNTER — HOSPITAL ENCOUNTER (EMERGENCY)
Facility: HOSPITAL | Age: 80
Discharge: HOME | End: 2025-01-15
Attending: EMERGENCY MEDICINE
Payer: MEDICARE

## 2025-01-15 VITALS
TEMPERATURE: 97.5 F | BODY MASS INDEX: 32.69 KG/M2 | RESPIRATION RATE: 18 BRPM | HEART RATE: 56 BPM | WEIGHT: 215 LBS | DIASTOLIC BLOOD PRESSURE: 70 MMHG | SYSTOLIC BLOOD PRESSURE: 170 MMHG | OXYGEN SATURATION: 93 %
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
## 2025-01-15 DIAGNOSIS — S40.022A ARM CONTUSION, LEFT, INITIAL ENCOUNTER: Primary | ICD-10-CM

## 2025-01-15 PROCEDURE — 73060 X-RAY EXAM OF HUMERUS: CPT

## 2025-01-15 PROCEDURE — 73030 X-RAY EXAM OF SHOULDER: CPT

## 2025-01-15 PROCEDURE — 99283 EMERGENCY DEPT VISIT LOW MDM: CPT

## 2025-01-15 RX ORDER — IBUPROFEN 600 MG/1
600 TABLET, FILM COATED ORAL 3 TIMES DAILY PRN
Qty: 30 TABLET | Refills: 0 | Status: SHIPPED | OUTPATIENT
Start: 2025-01-15

## 2025-01-15 ASSESSMENT — PAIN DESCRIPTION - ORIENTATION: ORIENTATION: LEFT

## 2025-01-15 ASSESSMENT — PAIN DESCRIPTION - LOCATION: LOCATION: ARM

## 2025-01-15 ASSESSMENT — PAIN SCALES - GENERAL: PAINLEVEL_OUTOF10: 6

## 2025-01-15 ASSESSMENT — PAIN - FUNCTIONAL ASSESSMENT: PAIN_FUNCTIONAL_ASSESSMENT: 0-10

## 2025-01-15 NOTE — ED PROVIDER NOTES
56   Resp: 18    Temp: 97.5 °F (36.4 °C)    TempSrc: Oral    SpO2: 100% 93%   Weight: 97.5 kg (215 lb)        Physical Exam  Diagnostic Study Results     LABS:  No results found for this visit on 01/15/25.    RADIOLOGIC STUDIES:   Non x-ray images such as CT, Ultrasound and MRI are read by the radiologist. X-ray images are visualized and preliminarily interpreted by the ED Provider with the findings as listed in the ED Course section below.     Interpretation per the Radiologist is listed below, if available at the time of this note:    XR HUMERUS LEFT (MIN 2 VIEWS)   Final Result      1.  No acute fracture of the left shoulder or humerus.   2.  Pulmonary edema pattern.         Electronically signed by DAVID FREED      XR SHOULDER LEFT (MIN 2 VIEWS)   Final Result      1.  No acute fracture of the left shoulder or humerus.   2.  Pulmonary edema pattern.         Electronically signed by DAVID FREED          SCREENINGS                    ED Course and Differential Diagnosis/MDM     10:01 AM EST DDx, ED Course, and Reassessment:    DDX: ***    Vitals: Patient Vitals for the past 24 hrs:   BP Temp Temp src Pulse Resp SpO2 Weight   01/15/25 0944 (!) 170/70 -- -- 56 -- 93 % --   01/15/25 0912 (!) 199/75 97.5 °F (36.4 °C) Oral 62 18 100 % 97.5 kg (215 lb)       ED COURSE/Records Reviewed with summary (prior medical records and Nursing notes)  {Records Reviewed:79180::\"Nursing Notes\"}    ED Course as of 01/15/25 1043   Wed Thad 15, 2025   1040 X-ray of left shoulder and humerus is negative for fracture. [MS]      ED Course User Index  [MS] Miguelangel Reeves MD     SEPSIS:  Is this patient to be included in the SEP-1 core measure? {Sep-1 Core Yes/No:440237}    Clinical Management Tools:  {CMT List:52475::\"Not Applicable\"}    Patient was given the following medications:    Medications - No data to display  CONSULTS:    None    Social Determinants affecting Diagnosis/Treatment: {Social Determinants:49193}    Smoking      ED Course and Differential Diagnosis/MDM     10:01 AM EST DDx, ED Course, and Reassessment:    DDX: Shoulder contusion, rotator cuff injury, humerus fracture    Vitals: Patient Vitals for the past 24 hrs:   BP Temp Temp src Pulse Resp SpO2 Weight   01/15/25 0944 (!) 170/70 -- -- 56 -- 93 % --   01/15/25 0912 (!) 199/75 97.5 °F (36.4 °C) Oral 62 18 100 % 97.5 kg (215 lb)       ED COURSE/Records Reviewed with summary (prior medical records and Nursing notes)  Nursing Notes and Old Medical Records    ED Course as of 01/15/25 1043   Wed Thad 15, 2025   1040 X-ray of left shoulder and humerus is negative for fracture. [MS]      ED Course User Index  [MS] Miguelangel Reeves MD     SEPSIS:  Is this patient to be included in the SEP-1 core measure? No Exclusion criteria - the patient is NOT to be included for SEP-1 Core Measure due to: Infection is not suspected    Clinical Management Tools:  Not Applicable    Patient was given the following medications:    Medications - No data to display  CONSULTS:    None    Social Determinants affecting Diagnosis/Treatment: None    DISCUSSION:  This appears to be a severe conditon.  This appears to be an acute condition.    79 y.o. female presents with left upper extremity pain after a mechanical fall 3 days ago.  Patient slipped on ice and landed on her left shoulder.  Will obtain plain films of the left shoulder and humerus to assess for fracture.  If negative, will treat with a sling for support, ice, NSAIDs, and refer her to outpatient orthopedic surgery. The decision to perform testing and results were discussed with the patient. I discussed each of these tests and considerations with the patient. Patient agrees with the plan of discharge and outpatient follow-up.    ADDITIONAL CONSIDERATIONS:  None  Procedures/Critical Care     Procedures    ED FINAL IMPRESSION     1. Arm contusion, left, initial encounter    2. Ground-level fall      DISPOSITION/PLAN     Discharge Note: The